# Patient Record
Sex: FEMALE | Race: WHITE | Employment: FULL TIME | URBAN - NONMETROPOLITAN AREA
[De-identification: names, ages, dates, MRNs, and addresses within clinical notes are randomized per-mention and may not be internally consistent; named-entity substitution may affect disease eponyms.]

---

## 2017-01-10 DIAGNOSIS — R23.2 HOT FLASHES: ICD-10-CM

## 2017-01-10 DIAGNOSIS — F41.9 ANXIETY: ICD-10-CM

## 2017-01-10 RX ORDER — CLONIDINE HYDROCHLORIDE 0.1 MG/1
TABLET ORAL
Qty: 60 TABLET | Refills: 0 | Status: SHIPPED | OUTPATIENT
Start: 2017-01-10 | End: 2018-06-18

## 2017-05-30 DIAGNOSIS — I10 ESSENTIAL HYPERTENSION: ICD-10-CM

## 2017-05-30 RX ORDER — LISINOPRIL 20 MG/1
20 TABLET ORAL DAILY
Qty: 30 TABLET | Refills: 0 | Status: SHIPPED | OUTPATIENT
Start: 2017-05-30 | End: 2017-06-06 | Stop reason: SDUPTHER

## 2017-06-06 DIAGNOSIS — I10 ESSENTIAL HYPERTENSION: ICD-10-CM

## 2017-06-06 DIAGNOSIS — F41.9 ANXIETY: ICD-10-CM

## 2017-06-06 DIAGNOSIS — F33.1 MODERATE EPISODE OF RECURRENT MAJOR DEPRESSIVE DISORDER (HCC): ICD-10-CM

## 2017-06-06 RX ORDER — LISINOPRIL 20 MG/1
20 TABLET ORAL DAILY
Qty: 30 TABLET | Refills: 0 | Status: SHIPPED | OUTPATIENT
Start: 2017-06-06 | End: 2017-06-16 | Stop reason: SDUPTHER

## 2017-06-06 RX ORDER — CITALOPRAM 20 MG/1
TABLET ORAL
Qty: 30 TABLET | Refills: 0 | Status: SHIPPED | OUTPATIENT
Start: 2017-06-06 | End: 2017-06-16 | Stop reason: SDUPTHER

## 2017-06-06 RX ORDER — HYDROCHLOROTHIAZIDE 12.5 MG/1
CAPSULE, GELATIN COATED ORAL
Qty: 30 CAPSULE | Refills: 0 | Status: SHIPPED | OUTPATIENT
Start: 2017-06-06 | End: 2017-06-16 | Stop reason: SDUPTHER

## 2017-06-16 ENCOUNTER — OFFICE VISIT (OUTPATIENT)
Dept: FAMILY MEDICINE CLINIC | Age: 52
End: 2017-06-16

## 2017-06-16 VITALS
WEIGHT: 256 LBS | DIASTOLIC BLOOD PRESSURE: 74 MMHG | OXYGEN SATURATION: 98 % | HEART RATE: 76 BPM | BODY MASS INDEX: 45.36 KG/M2 | SYSTOLIC BLOOD PRESSURE: 126 MMHG | HEIGHT: 63 IN

## 2017-06-16 DIAGNOSIS — E78.5 HYPERLIPIDEMIA, UNSPECIFIED HYPERLIPIDEMIA TYPE: ICD-10-CM

## 2017-06-16 DIAGNOSIS — Z11.4 SCREENING FOR HIV WITHOUT PRESENCE OF RISK FACTORS: ICD-10-CM

## 2017-06-16 DIAGNOSIS — F33.1 MODERATE EPISODE OF RECURRENT MAJOR DEPRESSIVE DISORDER (HCC): Primary | ICD-10-CM

## 2017-06-16 DIAGNOSIS — L29.9 PRURITIC DERMATITIS: ICD-10-CM

## 2017-06-16 DIAGNOSIS — F41.9 ANXIETY: ICD-10-CM

## 2017-06-16 DIAGNOSIS — Z72.0 TOBACCO ABUSE: ICD-10-CM

## 2017-06-16 DIAGNOSIS — Z23 NEED FOR PNEUMOCOCCAL VACCINATION: ICD-10-CM

## 2017-06-16 DIAGNOSIS — Z12.31 ENCOUNTER FOR SCREENING MAMMOGRAM FOR BREAST CANCER: ICD-10-CM

## 2017-06-16 DIAGNOSIS — Z12.11 SCREENING FOR COLON CANCER: ICD-10-CM

## 2017-06-16 DIAGNOSIS — Z11.59 NEED FOR HEPATITIS C SCREENING TEST: ICD-10-CM

## 2017-06-16 DIAGNOSIS — I10 ESSENTIAL HYPERTENSION: ICD-10-CM

## 2017-06-16 DIAGNOSIS — E55.9 VITAMIN D DEFICIENCY: ICD-10-CM

## 2017-06-16 PROCEDURE — 90732 PPSV23 VACC 2 YRS+ SUBQ/IM: CPT | Performed by: NURSE PRACTITIONER

## 2017-06-16 PROCEDURE — 90471 IMMUNIZATION ADMIN: CPT | Performed by: NURSE PRACTITIONER

## 2017-06-16 PROCEDURE — 99214 OFFICE O/P EST MOD 30 MIN: CPT | Performed by: NURSE PRACTITIONER

## 2017-06-16 RX ORDER — HYDROCHLOROTHIAZIDE 12.5 MG/1
CAPSULE, GELATIN COATED ORAL
Qty: 30 CAPSULE | Refills: 5 | Status: SHIPPED | OUTPATIENT
Start: 2017-06-16 | End: 2017-11-01 | Stop reason: SDUPTHER

## 2017-06-16 RX ORDER — LISINOPRIL 20 MG/1
20 TABLET ORAL DAILY
Qty: 30 TABLET | Refills: 5 | Status: SHIPPED | OUTPATIENT
Start: 2017-06-16 | End: 2017-11-01 | Stop reason: SDUPTHER

## 2017-06-16 RX ORDER — CITALOPRAM 40 MG/1
TABLET ORAL
Qty: 30 TABLET | Refills: 1 | Status: SHIPPED | OUTPATIENT
Start: 2017-06-16 | End: 2017-08-07 | Stop reason: ALTCHOICE

## 2017-06-16 ASSESSMENT — PATIENT HEALTH QUESTIONNAIRE - PHQ9
1. LITTLE INTEREST OR PLEASURE IN DOING THINGS: 0
SUM OF ALL RESPONSES TO PHQ QUESTIONS 1-9: 0
2. FEELING DOWN, DEPRESSED OR HOPELESS: 0
SUM OF ALL RESPONSES TO PHQ9 QUESTIONS 1 & 2: 0

## 2017-06-16 ASSESSMENT — ENCOUNTER SYMPTOMS
GASTROINTESTINAL NEGATIVE: 1
RESPIRATORY NEGATIVE: 1
EYES NEGATIVE: 1

## 2017-06-21 ENCOUNTER — TELEPHONE (OUTPATIENT)
Dept: OTHER | Facility: CLINIC | Age: 52
End: 2017-06-21

## 2017-08-07 ENCOUNTER — OFFICE VISIT (OUTPATIENT)
Dept: FAMILY MEDICINE CLINIC | Age: 52
End: 2017-08-07

## 2017-08-07 VITALS
HEIGHT: 63 IN | HEART RATE: 81 BPM | SYSTOLIC BLOOD PRESSURE: 122 MMHG | DIASTOLIC BLOOD PRESSURE: 84 MMHG | WEIGHT: 263.2 LBS | BODY MASS INDEX: 46.64 KG/M2 | OXYGEN SATURATION: 97 %

## 2017-08-07 DIAGNOSIS — F41.8 DEPRESSION WITH ANXIETY: Primary | ICD-10-CM

## 2017-08-07 DIAGNOSIS — Z13.31 POSITIVE DEPRESSION SCREENING: ICD-10-CM

## 2017-08-07 PROCEDURE — G8431 POS CLIN DEPRES SCRN F/U DOC: HCPCS | Performed by: NURSE PRACTITIONER

## 2017-08-07 PROCEDURE — 99213 OFFICE O/P EST LOW 20 MIN: CPT | Performed by: NURSE PRACTITIONER

## 2017-08-07 PROCEDURE — G0444 DEPRESSION SCREEN ANNUAL: HCPCS | Performed by: NURSE PRACTITIONER

## 2017-08-07 ASSESSMENT — PATIENT HEALTH QUESTIONNAIRE - PHQ9
SUM OF ALL RESPONSES TO PHQ9 QUESTIONS 1 & 2: 6
4. FEELING TIRED OR HAVING LITTLE ENERGY: 3
7. TROUBLE CONCENTRATING ON THINGS, SUCH AS READING THE NEWSPAPER OR WATCHING TELEVISION: 1
2. FEELING DOWN, DEPRESSED OR HOPELESS: 3
8. MOVING OR SPEAKING SO SLOWLY THAT OTHER PEOPLE COULD HAVE NOTICED. OR THE OPPOSITE, BEING SO FIGETY OR RESTLESS THAT YOU HAVE BEEN MOVING AROUND A LOT MORE THAN USUAL: 1
5. POOR APPETITE OR OVEREATING: 3
9. THOUGHTS THAT YOU WOULD BE BETTER OFF DEAD, OR OF HURTING YOURSELF: 0
3. TROUBLE FALLING OR STAYING ASLEEP: 3
1. LITTLE INTEREST OR PLEASURE IN DOING THINGS: 3
SUM OF ALL RESPONSES TO PHQ QUESTIONS 1-9: 18
10. IF YOU CHECKED OFF ANY PROBLEMS, HOW DIFFICULT HAVE THESE PROBLEMS MADE IT FOR YOU TO DO YOUR WORK, TAKE CARE OF THINGS AT HOME, OR GET ALONG WITH OTHER PEOPLE: 0
6. FEELING BAD ABOUT YOURSELF - OR THAT YOU ARE A FAILURE OR HAVE LET YOURSELF OR YOUR FAMILY DOWN: 1

## 2017-08-07 ASSESSMENT — ENCOUNTER SYMPTOMS
RESPIRATORY NEGATIVE: 1
GASTROINTESTINAL NEGATIVE: 1
EYES NEGATIVE: 1

## 2017-09-02 DIAGNOSIS — F41.8 DEPRESSION WITH ANXIETY: ICD-10-CM

## 2017-09-07 ENCOUNTER — TELEPHONE (OUTPATIENT)
Dept: FAMILY MEDICINE CLINIC | Age: 52
End: 2017-09-07

## 2017-09-07 DIAGNOSIS — F41.8 DEPRESSION WITH ANXIETY: ICD-10-CM

## 2017-10-10 DIAGNOSIS — F41.8 DEPRESSION WITH ANXIETY: ICD-10-CM

## 2017-10-10 NOTE — TELEPHONE ENCOUNTER
[phone] (home) I spoke to the pt at this time and she states she is taking the zoloft and she states she is doing a lot better.  Appointment scheduled October 30th at 10:20 am.

## 2017-10-10 NOTE — TELEPHONE ENCOUNTER
Please verify that patient is still taking this medication and how she is doing on the medication. Patient shows she was last seen in August and was prescribed this and informed not to take celexa - is this what she is doing? She needs to make a follow up appointment with fasting blood work.

## 2017-10-30 ENCOUNTER — OFFICE VISIT (OUTPATIENT)
Dept: FAMILY MEDICINE CLINIC | Age: 52
End: 2017-10-30

## 2017-10-30 VITALS
HEART RATE: 88 BPM | WEIGHT: 273 LBS | HEIGHT: 63 IN | OXYGEN SATURATION: 98 % | BODY MASS INDEX: 48.37 KG/M2 | DIASTOLIC BLOOD PRESSURE: 74 MMHG | SYSTOLIC BLOOD PRESSURE: 120 MMHG

## 2017-10-30 DIAGNOSIS — F41.8 DEPRESSION WITH ANXIETY: Primary | ICD-10-CM

## 2017-10-30 DIAGNOSIS — R29.6 RECURRENT FALLS WHILE WALKING: ICD-10-CM

## 2017-10-30 PROCEDURE — 99213 OFFICE O/P EST LOW 20 MIN: CPT | Performed by: NURSE PRACTITIONER

## 2017-10-30 RX ORDER — SERTRALINE HYDROCHLORIDE 100 MG/1
100 TABLET, FILM COATED ORAL DAILY
Qty: 30 TABLET | Refills: 5 | Status: SHIPPED | OUTPATIENT
Start: 2017-10-30 | End: 2018-06-18

## 2017-10-30 ASSESSMENT — ENCOUNTER SYMPTOMS
EYES NEGATIVE: 1
GASTROINTESTINAL NEGATIVE: 1
RESPIRATORY NEGATIVE: 1

## 2017-10-30 NOTE — PROGRESS NOTES
Subjective:     Patient Name: Nhua Zelaya is a 46 y.o. female. Chief Complaint   Patient presents with    Depression     f/u after starting Zoloft - pt says she's responded well to it        HPI  Mood Disorder:  Patient presents for follow-up of depression and anxiety disorder. Current complaints include: tearfulness, insomnia, fatigue, difficulty concentrating, excessive worry and anxious but all of these symptoms have significantly improved from last office visit of 8/7/17. She denies feelings of hopelessness, feelings of worthlessness/excessive guilt, panic attacks, obsessive thoughts, increased use of drugs or alcohol and suicidal thoughts or behavior. Symptoms/signs of rambo: none. External stressors: nothing new. Current treatment includes: Zoloft- 50 mg daily. Medication side effects: none. Patient was seen on 8/7/17 and at that time she was on Celexa 40 mg and was ineffective. Celexa was discontinued and patient was started on Zoloft 50 mg daily and symptoms still present but significantly improved    Recurrent Falls  Patient is here for evaluation of recurrent falls. Patient reportedly was walking when she fell from standing. The problem started about  2-3 years ago. It is reported that the patient did not have LOC, was ambulatory on scene, denies alcohol consumption and denies drug use. At this time she complains of low back pain--chronic problem without sciatica. Patient denies headache, gever, visual change, hearing loss, neck pain, chest pain, abdominal pain, hematuria, flank pain, nausea, vomiting, numbness, tingling, incontinence and inability to ambulate. Symptoms are exacerbated by Nothing. Patient states she falls a lot while walking only. Doesn't feel dizzy when changing positions or prior to falling. Falling at least once per week but \"catching self\" daily. No previous workup    Review of Systems   Constitutional: Negative. HENT: Negative. Eyes: Negative. Respiratory: Negative. Cardiovascular: Negative. Gastrointestinal: Negative. Genitourinary: Negative. Musculoskeletal: Positive for falls. Skin: Negative. Neurological: Negative. Endo/Heme/Allergies: Negative. Psychiatric/Behavioral: Positive for depression. The patient is nervous/anxious and has insomnia. All other systems reviewed and are negative. Past Medical History:   Diagnosis Date    Back pain     Hypertension     Plantar fasciitis      Family History   Problem Relation Age of Onset    Diabetes Mother     High Blood Pressure Mother     High Blood Pressure Father     High Blood Pressure Sister      Past Surgical History:   Procedure Laterality Date     SECTION       Social History     Social History    Marital status:      Spouse name: N/A    Number of children: N/A    Years of education: N/A     Occupational History    Not on file. Social History Main Topics    Smoking status: Current Every Day Smoker     Packs/day: 0.50     Types: Cigarettes     Last attempt to quit: 2015    Smokeless tobacco: Never Used    Alcohol use No    Drug use: No    Sexual activity: Yes     Partners: Male     Other Topics Concern    Not on file     Social History Narrative    No narrative on file     Current Outpatient Prescriptions   Medication Sig Dispense Refill    sertraline (ZOLOFT) 50 MG tablet TAKE ONE TABLET BY MOUTH DAILY 90 tablet 4    hydrochlorothiazide (MICROZIDE) 12.5 MG capsule TAKE ONE CAPSULE BY MOUTH DAILY 30 capsule 5    lisinopril (PRINIVIL;ZESTRIL) 20 MG tablet Take 1 tablet by mouth daily 30 tablet 5    cloNIDine (CATAPRES) 0.1 MG tablet Take 1 tablet by mouth daily as needed 60 tablet 0     No current facility-administered medications for this visit. Objective:       Physical Exam   Constitutional: She is oriented to person, place, and time. She appears well-developed and well-nourished. No distress.    HENT:   Head: Normocephalic and atraumatic. Right Ear: Tympanic membrane, external ear and ear canal normal.   Left Ear: Tympanic membrane, external ear and ear canal normal.   Nose: Nose normal.   Mouth/Throat: Uvula is midline, oropharynx is clear and moist and mucous membranes are normal. No oropharyngeal exudate. Eyes: Conjunctivae, EOM and lids are normal. Pupils are equal, round, and reactive to light. Neck: Normal range of motion. Neck supple. No JVD present. Carotid bruit is not present. No thyromegaly present. Cardiovascular: Normal rate, regular rhythm, normal heart sounds and normal pulses. Exam reveals no gallop and no friction rub. No murmur heard. Pulses:       Radial pulses are 2+ on the right side, and 2+ on the left side. Dorsalis pedis pulses are 2+ on the right side, and 2+ on the left side. Posterior tibial pulses are 2+ on the right side, and 2+ on the left side. Pulmonary/Chest: Effort normal and breath sounds normal.   Abdominal: Soft. Normal appearance and bowel sounds are normal. She exhibits no mass. There is no hepatosplenomegaly. There is no tenderness. Musculoskeletal: Normal range of motion. She exhibits no edema. Lymphadenopathy:        Head (right side): No submandibular adenopathy present. Head (left side): No submandibular adenopathy present. She has no cervical adenopathy. Neurological: She is alert and oriented to person, place, and time. She has normal strength. Gait normal.   Skin: Skin is warm and dry. No lesion and no rash noted. Psychiatric: She has a normal mood and affect. Her speech is normal and behavior is normal. Judgment and thought content normal. Cognition and memory are normal.   Nursing note and vitals reviewed.       /74 (Site: Left Arm, Position: Sitting, Cuff Size: Large Adult)   Pulse 88   Ht 5' 3\" (1.6 m)   Wt 273 lb (123.8 kg)   LMP 04/10/2012 (Exact Date)   SpO2 98% Comment: CHRISSY  BMI 48.36 kg/m²   Body mass index is 48.36 kg/m². BP Readings from Last 2 Encounters:   10/30/17 120/74   10/01/17 117/65       Wt Readings from Last 3 Encounters:   10/30/17 273 lb (123.8 kg)   10/01/17 265 lb (120.2 kg)   08/07/17 263 lb 3.2 oz (119.4 kg)       Lab Review   No visits with results within 2 Month(s) from this visit. Latest known visit with results is:   Nurse Only on 10/26/2016   Component Date Value    Sodium 10/26/2016 142     Potassium 10/26/2016 4.2     Chloride 10/26/2016 103     CO2 10/26/2016 27     Anion Gap 10/26/2016 12     Glucose 10/26/2016 96     BUN 10/26/2016 17     CREATININE 10/26/2016 0.5*    GFR Non- 10/26/2016 >60     GFR  10/26/2016 >60     Calcium 10/26/2016 9.2     Total Protein 10/26/2016 6.4     Alb 10/26/2016 4.0     Albumin/Globulin Ratio 10/26/2016 1.7     Total Bilirubin 10/26/2016 <0.2     Alkaline Phosphatase 10/26/2016 63     ALT 10/26/2016 14     AST 10/26/2016 14*    Globulin 10/26/2016 2.4     Cholesterol, Total 10/26/2016 151     Triglycerides 10/26/2016 219*    HDL 10/26/2016 48     LDL Calculated 10/26/2016 59     VLDL CHOLESTEROL CALCULA* 10/26/2016 44     WBC 10/26/2016 6.0     RBC 10/26/2016 4.32     Hemoglobin 10/26/2016 12.8     Hematocrit 10/26/2016 39.0     MCV 10/26/2016 90.4     MCH 10/26/2016 29.6     MCHC 10/26/2016 32.8     RDW 10/26/2016 14.4     Platelets 71/59/1106 187     MPV 10/26/2016 10.5     Neutrophils % 10/26/2016 55.9     Lymphocytes % 10/26/2016 32.2     Monocytes % 10/26/2016 9.3     Eosinophils % 10/26/2016 1.7     Basophils % 10/26/2016 0.9     Neutrophils # 10/26/2016 3.4     Lymphocytes # 10/26/2016 1.9     Monocytes # 10/26/2016 0.6     Eosinophils # 10/26/2016 0.1     Basophils # 10/26/2016 0.1        No results found for this visit on 10/30/17. Assessment:       1. Depression with anxiety    2.  Recurrent falls while walking               Plan:       Luiz Samuel was seen today for depression. Diagnoses and all orders for this visit:    Depression with anxiety  Patient is having significant improvement on Zoloft however she is still somewhat symptomatic on Zoloft 50 mg daily. Will increase Zoloft to chin as noted below  -     sertraline (ZOLOFT) 100 MG tablet; Take 1 tablet by mouth daily  Educated if patient develops SI/HI/rambo to call 911 or go to ER. Discussed use, benefit, risks and side effects of prescribed medications. Barriers to compliance discussed. All patient questions answered. Pt voiced understanding. Recurrent falls while walking  Unclear etiology. Patient is to have fasting labs later this week but will also add the following labs  -     TSH without Reflex; Future  -     MAGNESIUM; Future    Once blood work results have returned if there is no appearance reason will consider MRI of the brain and/or neurology consult      Patient has been instructed call the office immediately with new symptoms, change in symptoms or worsening of symptoms. If this is not feasible, patient is instructed to report to the emergency room. Medication profile reviewed. Medication side effects and possible impairments from medications were discussed as applicable. Allergies were reviewed. Health maintenance was reviewed and updated as appropriate.

## 2017-11-01 DIAGNOSIS — I10 ESSENTIAL HYPERTENSION: ICD-10-CM

## 2017-11-01 RX ORDER — HYDROCHLOROTHIAZIDE 12.5 MG/1
CAPSULE, GELATIN COATED ORAL
Qty: 90 CAPSULE | Refills: 3 | Status: SHIPPED | OUTPATIENT
Start: 2017-11-01 | End: 2018-12-01 | Stop reason: SDUPTHER

## 2017-11-02 ENCOUNTER — NURSE ONLY (OUTPATIENT)
Dept: FAMILY MEDICINE CLINIC | Age: 52
End: 2017-11-02

## 2017-11-02 DIAGNOSIS — Z11.59 NEED FOR HEPATITIS C SCREENING TEST: ICD-10-CM

## 2017-11-02 DIAGNOSIS — R29.6 RECURRENT FALLS WHILE WALKING: ICD-10-CM

## 2017-11-02 DIAGNOSIS — E55.9 VITAMIN D DEFICIENCY: ICD-10-CM

## 2017-11-02 DIAGNOSIS — I10 ESSENTIAL HYPERTENSION: ICD-10-CM

## 2017-11-02 DIAGNOSIS — Z11.4 SCREENING FOR HIV WITHOUT PRESENCE OF RISK FACTORS: ICD-10-CM

## 2017-11-02 DIAGNOSIS — E78.5 HYPERLIPIDEMIA, UNSPECIFIED HYPERLIPIDEMIA TYPE: ICD-10-CM

## 2017-11-02 LAB
A/G RATIO: 1.4 (ref 1.1–2.2)
ALBUMIN SERPL-MCNC: 4.2 G/DL (ref 3.4–5)
ALP BLD-CCNC: 77 U/L (ref 40–129)
ALT SERPL-CCNC: 14 U/L (ref 10–40)
ANION GAP SERPL CALCULATED.3IONS-SCNC: 19 MMOL/L (ref 3–16)
AST SERPL-CCNC: 15 U/L (ref 15–37)
BASOPHILS ABSOLUTE: 0 K/UL (ref 0–0.2)
BASOPHILS RELATIVE PERCENT: 0.6 %
BILIRUB SERPL-MCNC: 0.3 MG/DL (ref 0–1)
BUN BLDV-MCNC: 23 MG/DL (ref 7–20)
CALCIUM SERPL-MCNC: 9.7 MG/DL (ref 8.3–10.6)
CHLORIDE BLD-SCNC: 100 MMOL/L (ref 99–110)
CHOLESTEROL, TOTAL: 183 MG/DL (ref 0–199)
CO2: 23 MMOL/L (ref 21–32)
CREAT SERPL-MCNC: <0.5 MG/DL (ref 0.6–1.1)
EOSINOPHILS ABSOLUTE: 0.1 K/UL (ref 0–0.6)
EOSINOPHILS RELATIVE PERCENT: 2 %
GFR AFRICAN AMERICAN: >60
GFR NON-AFRICAN AMERICAN: >60
GLOBULIN: 3.1 G/DL
GLUCOSE BLD-MCNC: 90 MG/DL (ref 70–99)
HCT VFR BLD CALC: 39.6 % (ref 36–48)
HDLC SERPL-MCNC: 58 MG/DL (ref 40–60)
HEMOGLOBIN: 13.2 G/DL (ref 12–16)
HEPATITIS C ANTIBODY INTERPRETATION: NORMAL
LDL CHOLESTEROL CALCULATED: 81 MG/DL
LYMPHOCYTES ABSOLUTE: 2.2 K/UL (ref 1–5.1)
LYMPHOCYTES RELATIVE PERCENT: 31.4 %
MAGNESIUM: 2.2 MG/DL (ref 1.8–2.4)
MCH RBC QN AUTO: 30.2 PG (ref 26–34)
MCHC RBC AUTO-ENTMCNC: 33.3 G/DL (ref 31–36)
MCV RBC AUTO: 90.8 FL (ref 80–100)
MONOCYTES ABSOLUTE: 0.7 K/UL (ref 0–1.3)
MONOCYTES RELATIVE PERCENT: 9.7 %
NEUTROPHILS ABSOLUTE: 3.9 K/UL (ref 1.7–7.7)
NEUTROPHILS RELATIVE PERCENT: 56.3 %
PDW BLD-RTO: 14.6 % (ref 12.4–15.4)
PLATELET # BLD: 220 K/UL (ref 135–450)
PMV BLD AUTO: 10.2 FL (ref 5–10.5)
POTASSIUM SERPL-SCNC: 4.4 MMOL/L (ref 3.5–5.1)
RBC # BLD: 4.36 M/UL (ref 4–5.2)
SODIUM BLD-SCNC: 142 MMOL/L (ref 136–145)
TOTAL PROTEIN: 7.3 G/DL (ref 6.4–8.2)
TRIGL SERPL-MCNC: 219 MG/DL (ref 0–150)
TSH SERPL DL<=0.05 MIU/L-ACNC: 1.65 UIU/ML (ref 0.27–4.2)
VITAMIN D 25-HYDROXY: 17.6 NG/ML
VLDLC SERPL CALC-MCNC: 44 MG/DL
WBC # BLD: 6.9 K/UL (ref 4–11)

## 2017-11-02 PROCEDURE — 36415 COLL VENOUS BLD VENIPUNCTURE: CPT | Performed by: NURSE PRACTITIONER

## 2017-11-03 LAB — HIV-1 AND HIV-2 ANTIBODIES: NORMAL

## 2017-11-13 DIAGNOSIS — E55.9 VITAMIN D DEFICIENCY: Primary | ICD-10-CM

## 2017-11-13 RX ORDER — MELATONIN 10 MG
1 TABLET, SUBLINGUAL SUBLINGUAL WEEKLY
Qty: 4 TABLET | Refills: 5 | Status: SHIPPED | OUTPATIENT
Start: 2017-11-13 | End: 2019-01-18 | Stop reason: DRUGHIGH

## 2018-05-17 DIAGNOSIS — I10 ESSENTIAL HYPERTENSION: ICD-10-CM

## 2018-05-18 RX ORDER — LISINOPRIL 20 MG/1
TABLET ORAL
Qty: 30 TABLET | Refills: 0 | Status: SHIPPED | OUTPATIENT
Start: 2018-05-18 | End: 2018-06-26 | Stop reason: SDUPTHER

## 2018-06-18 ENCOUNTER — HOSPITAL ENCOUNTER (OUTPATIENT)
Dept: OTHER | Age: 53
Discharge: HOME | End: 2018-06-18
Attending: NURSE PRACTITIONER | Admitting: NURSE PRACTITIONER

## 2018-06-18 ENCOUNTER — OFFICE VISIT (OUTPATIENT)
Dept: FAMILY MEDICINE CLINIC | Age: 53
End: 2018-06-18

## 2018-06-18 VITALS
OXYGEN SATURATION: 97 % | SYSTOLIC BLOOD PRESSURE: 118 MMHG | HEART RATE: 96 BPM | BODY MASS INDEX: 49.46 KG/M2 | WEIGHT: 279.2 LBS | DIASTOLIC BLOOD PRESSURE: 78 MMHG

## 2018-06-18 DIAGNOSIS — Z23 NEED FOR PROPHYLACTIC VACCINATION AGAINST DIPHTHERIA-TETANUS-PERTUSSIS (DTP): ICD-10-CM

## 2018-06-18 DIAGNOSIS — F41.8 DEPRESSION WITH ANXIETY: Primary | ICD-10-CM

## 2018-06-18 DIAGNOSIS — Z23 NEED FOR PROPHYLACTIC VACCINATION AND INOCULATION AGAINST VARICELLA: ICD-10-CM

## 2018-06-18 DIAGNOSIS — R05.3 CHRONIC COUGH: ICD-10-CM

## 2018-06-18 DIAGNOSIS — Z12.11 SCREENING FOR COLON CANCER: ICD-10-CM

## 2018-06-18 DIAGNOSIS — E55.9 VITAMIN D DEFICIENCY: ICD-10-CM

## 2018-06-18 DIAGNOSIS — I10 ESSENTIAL HYPERTENSION: ICD-10-CM

## 2018-06-18 DIAGNOSIS — Z72.0 TOBACCO ABUSE: ICD-10-CM

## 2018-06-18 DIAGNOSIS — Z12.39 BREAST CANCER SCREENING: ICD-10-CM

## 2018-06-18 PROBLEM — Z87.891 HISTORY OF TOBACCO USE: Status: ACTIVE | Noted: 2018-06-18

## 2018-06-18 PROCEDURE — 99214 OFFICE O/P EST MOD 30 MIN: CPT | Performed by: NURSE PRACTITIONER

## 2018-06-18 PROCEDURE — 90715 TDAP VACCINE 7 YRS/> IM: CPT | Performed by: NURSE PRACTITIONER

## 2018-06-18 PROCEDURE — 90471 IMMUNIZATION ADMIN: CPT | Performed by: NURSE PRACTITIONER

## 2018-06-18 ASSESSMENT — ENCOUNTER SYMPTOMS
SHORTNESS OF BREATH: 0
COUGH: 1
SORE THROAT: 0
WHEEZING: 1
HEMOPTYSIS: 0
HEARTBURN: 1
RHINORRHEA: 0

## 2018-06-19 PROBLEM — Z87.891 HISTORY OF TOBACCO USE: Status: RESOLVED | Noted: 2018-06-18 | Resolved: 2018-06-19

## 2018-06-19 PROBLEM — E55.9 VITAMIN D DEFICIENCY: Status: ACTIVE | Noted: 2018-06-19

## 2018-06-19 RX ORDER — OMEPRAZOLE 40 MG/1
40 CAPSULE, DELAYED RELEASE ORAL DAILY
Qty: 30 CAPSULE | Refills: 1 | Status: SHIPPED | OUTPATIENT
Start: 2018-06-19 | End: 2018-08-29 | Stop reason: SDUPTHER

## 2018-06-19 ASSESSMENT — ENCOUNTER SYMPTOMS: EYES NEGATIVE: 1

## 2018-06-26 DIAGNOSIS — I10 ESSENTIAL HYPERTENSION: ICD-10-CM

## 2018-06-26 RX ORDER — LISINOPRIL 20 MG/1
TABLET ORAL
Qty: 30 TABLET | Refills: 1 | Status: SHIPPED | OUTPATIENT
Start: 2018-06-26 | End: 2018-08-29 | Stop reason: SDUPTHER

## 2018-07-20 DIAGNOSIS — Z12.39 SCREENING BREAST EXAMINATION: Primary | ICD-10-CM

## 2018-08-29 DIAGNOSIS — I10 ESSENTIAL HYPERTENSION: ICD-10-CM

## 2018-08-30 RX ORDER — LISINOPRIL 20 MG/1
TABLET ORAL
Qty: 30 TABLET | Refills: 2 | Status: SHIPPED | OUTPATIENT
Start: 2018-08-30 | End: 2018-12-01 | Stop reason: SDUPTHER

## 2018-12-01 DIAGNOSIS — I10 ESSENTIAL HYPERTENSION: ICD-10-CM

## 2018-12-03 RX ORDER — HYDROCHLOROTHIAZIDE 12.5 MG/1
CAPSULE, GELATIN COATED ORAL
Qty: 90 CAPSULE | Refills: 0 | Status: SHIPPED | OUTPATIENT
Start: 2018-12-03 | End: 2019-03-06 | Stop reason: SDUPTHER

## 2018-12-03 RX ORDER — LISINOPRIL 20 MG/1
TABLET ORAL
Qty: 30 TABLET | Refills: 0 | Status: SHIPPED | OUTPATIENT
Start: 2018-12-03 | End: 2019-01-05 | Stop reason: SDUPTHER

## 2019-01-05 DIAGNOSIS — I10 ESSENTIAL HYPERTENSION: ICD-10-CM

## 2019-01-07 RX ORDER — LISINOPRIL 20 MG/1
TABLET ORAL
Qty: 30 TABLET | Refills: 0 | Status: SHIPPED | OUTPATIENT
Start: 2019-01-07 | End: 2019-02-05 | Stop reason: SDUPTHER

## 2019-01-16 ENCOUNTER — OFFICE VISIT (OUTPATIENT)
Dept: FAMILY MEDICINE CLINIC | Age: 54
End: 2019-01-16
Payer: COMMERCIAL

## 2019-01-16 VITALS
OXYGEN SATURATION: 98 % | WEIGHT: 283 LBS | DIASTOLIC BLOOD PRESSURE: 78 MMHG | HEIGHT: 63 IN | SYSTOLIC BLOOD PRESSURE: 102 MMHG | HEART RATE: 81 BPM | BODY MASS INDEX: 50.14 KG/M2

## 2019-01-16 DIAGNOSIS — K59.00 CONSTIPATION, UNSPECIFIED CONSTIPATION TYPE: ICD-10-CM

## 2019-01-16 DIAGNOSIS — I10 ESSENTIAL HYPERTENSION: Primary | ICD-10-CM

## 2019-01-16 DIAGNOSIS — Z12.39 SCREENING FOR BREAST CANCER: ICD-10-CM

## 2019-01-16 DIAGNOSIS — N95.1 MENOPAUSAL SYMPTOMS: ICD-10-CM

## 2019-01-16 DIAGNOSIS — K21.9 GASTROESOPHAGEAL REFLUX DISEASE WITHOUT ESOPHAGITIS: ICD-10-CM

## 2019-01-16 DIAGNOSIS — E55.9 VITAMIN D DEFICIENCY: ICD-10-CM

## 2019-01-16 DIAGNOSIS — F41.8 DEPRESSION WITH ANXIETY: ICD-10-CM

## 2019-01-16 DIAGNOSIS — Z12.11 SCREENING FOR COLON CANCER: ICD-10-CM

## 2019-01-16 PROCEDURE — 99214 OFFICE O/P EST MOD 30 MIN: CPT | Performed by: NURSE PRACTITIONER

## 2019-01-16 PROCEDURE — 36415 COLL VENOUS BLD VENIPUNCTURE: CPT | Performed by: NURSE PRACTITIONER

## 2019-01-16 RX ORDER — VENLAFAXINE HYDROCHLORIDE 75 MG/1
75 CAPSULE, EXTENDED RELEASE ORAL DAILY
Qty: 30 CAPSULE | Refills: 1 | Status: SHIPPED | OUTPATIENT
Start: 2019-01-16 | End: 2019-03-08 | Stop reason: DRUGHIGH

## 2019-01-16 ASSESSMENT — PATIENT HEALTH QUESTIONNAIRE - PHQ9
SUM OF ALL RESPONSES TO PHQ QUESTIONS 1-9: 1
SUM OF ALL RESPONSES TO PHQ QUESTIONS 1-9: 1
2. FEELING DOWN, DEPRESSED OR HOPELESS: 1
1. LITTLE INTEREST OR PLEASURE IN DOING THINGS: 0
SUM OF ALL RESPONSES TO PHQ9 QUESTIONS 1 & 2: 1

## 2019-01-16 ASSESSMENT — ENCOUNTER SYMPTOMS
DIARRHEA: 0
NAUSEA: 0
ABDOMINAL PAIN: 0
RECTAL PAIN: 0
CONSTIPATION: 1
BLOOD IN STOOL: 1
EYES NEGATIVE: 1
RESPIRATORY NEGATIVE: 1
ABDOMINAL DISTENTION: 0
VOMITING: 0
ANAL BLEEDING: 0

## 2019-01-17 LAB
A/G RATIO: 1.5 (ref 1.1–2.2)
ALBUMIN SERPL-MCNC: 4.3 G/DL (ref 3.4–5)
ALP BLD-CCNC: 74 U/L (ref 40–129)
ALT SERPL-CCNC: 21 U/L (ref 10–40)
ANION GAP SERPL CALCULATED.3IONS-SCNC: 15 MMOL/L (ref 3–16)
AST SERPL-CCNC: 22 U/L (ref 15–37)
BASOPHILS ABSOLUTE: 0 K/UL (ref 0–0.2)
BASOPHILS RELATIVE PERCENT: 0.8 %
BILIRUB SERPL-MCNC: 0.3 MG/DL (ref 0–1)
BUN BLDV-MCNC: 15 MG/DL (ref 7–20)
CALCIUM SERPL-MCNC: 9.5 MG/DL (ref 8.3–10.6)
CHLORIDE BLD-SCNC: 105 MMOL/L (ref 99–110)
CO2: 24 MMOL/L (ref 21–32)
CREAT SERPL-MCNC: <0.5 MG/DL (ref 0.6–1.1)
EOSINOPHILS ABSOLUTE: 0.1 K/UL (ref 0–0.6)
EOSINOPHILS RELATIVE PERCENT: 1.3 %
GFR AFRICAN AMERICAN: >60
GFR NON-AFRICAN AMERICAN: >60
GLOBULIN: 2.9 G/DL
GLUCOSE BLD-MCNC: 93 MG/DL (ref 70–99)
HCT VFR BLD CALC: 39.3 % (ref 36–48)
HEMOGLOBIN: 13.2 G/DL (ref 12–16)
LYMPHOCYTES ABSOLUTE: 1.9 K/UL (ref 1–5.1)
LYMPHOCYTES RELATIVE PERCENT: 30.6 %
MCH RBC QN AUTO: 30.3 PG (ref 26–34)
MCHC RBC AUTO-ENTMCNC: 33.7 G/DL (ref 31–36)
MCV RBC AUTO: 89.9 FL (ref 80–100)
MONOCYTES ABSOLUTE: 0.6 K/UL (ref 0–1.3)
MONOCYTES RELATIVE PERCENT: 9.5 %
NEUTROPHILS ABSOLUTE: 3.5 K/UL (ref 1.7–7.7)
NEUTROPHILS RELATIVE PERCENT: 57.8 %
PDW BLD-RTO: 14.7 % (ref 12.4–15.4)
PLATELET # BLD: 229 K/UL (ref 135–450)
PMV BLD AUTO: 10.4 FL (ref 5–10.5)
POTASSIUM SERPL-SCNC: 4.3 MMOL/L (ref 3.5–5.1)
RBC # BLD: 4.37 M/UL (ref 4–5.2)
SODIUM BLD-SCNC: 144 MMOL/L (ref 136–145)
TOTAL PROTEIN: 7.2 G/DL (ref 6.4–8.2)
VITAMIN D 25-HYDROXY: 14.9 NG/ML
WBC # BLD: 6.1 K/UL (ref 4–11)

## 2019-01-18 DIAGNOSIS — E55.9 VITAMIN D DEFICIENCY: Primary | ICD-10-CM

## 2019-01-18 RX ORDER — CHOLECALCIFEROL (VITAMIN D3) 1250 MCG
1 CAPSULE ORAL WEEKLY
Qty: 4 CAPSULE | Refills: 5 | Status: SHIPPED | OUTPATIENT
Start: 2019-01-18 | End: 2019-07-01 | Stop reason: SDUPTHER

## 2019-01-25 ENCOUNTER — HOSPITAL ENCOUNTER (OUTPATIENT)
Dept: MAMMOGRAPHY | Age: 54
Discharge: HOME | End: 2019-01-30
Payer: COMMERCIAL

## 2019-01-25 DIAGNOSIS — Z12.39 SCREENING BREAST EXAMINATION: ICD-10-CM

## 2019-01-25 PROCEDURE — 77067 SCR MAMMO BI INCL CAD: CPT

## 2019-01-28 DIAGNOSIS — Z12.39 SCREENING FOR BREAST CANCER: Primary | ICD-10-CM

## 2019-02-05 DIAGNOSIS — I10 ESSENTIAL HYPERTENSION: ICD-10-CM

## 2019-02-05 RX ORDER — LISINOPRIL 20 MG/1
TABLET ORAL
Qty: 30 TABLET | Refills: 0 | Status: SHIPPED | OUTPATIENT
Start: 2019-02-05 | End: 2019-03-05 | Stop reason: SDUPTHER

## 2019-03-05 DIAGNOSIS — I10 ESSENTIAL HYPERTENSION: ICD-10-CM

## 2019-03-05 RX ORDER — LISINOPRIL 20 MG/1
TABLET ORAL
Qty: 30 TABLET | Refills: 0 | Status: SHIPPED | OUTPATIENT
Start: 2019-03-05 | End: 2019-04-03 | Stop reason: SDUPTHER

## 2019-03-06 DIAGNOSIS — I10 ESSENTIAL HYPERTENSION: ICD-10-CM

## 2019-03-06 RX ORDER — HYDROCHLOROTHIAZIDE 12.5 MG/1
CAPSULE, GELATIN COATED ORAL
Qty: 90 CAPSULE | Refills: 0 | Status: SHIPPED | OUTPATIENT
Start: 2019-03-06 | End: 2019-06-02 | Stop reason: SDUPTHER

## 2019-03-08 ENCOUNTER — OFFICE VISIT (OUTPATIENT)
Dept: FAMILY MEDICINE CLINIC | Age: 54
End: 2019-03-08
Payer: COMMERCIAL

## 2019-03-08 VITALS
WEIGHT: 289 LBS | HEART RATE: 91 BPM | HEIGHT: 63 IN | BODY MASS INDEX: 51.21 KG/M2 | SYSTOLIC BLOOD PRESSURE: 112 MMHG | DIASTOLIC BLOOD PRESSURE: 80 MMHG | OXYGEN SATURATION: 97 %

## 2019-03-08 DIAGNOSIS — N30.01 ACUTE CYSTITIS WITH HEMATURIA: ICD-10-CM

## 2019-03-08 DIAGNOSIS — F41.8 DEPRESSION WITH ANXIETY: Primary | ICD-10-CM

## 2019-03-08 DIAGNOSIS — R30.0 DYSURIA: ICD-10-CM

## 2019-03-08 DIAGNOSIS — N95.1 MENOPAUSAL SYMPTOMS: ICD-10-CM

## 2019-03-08 LAB
BILIRUBIN, POC: NORMAL
BLOOD URINE, POC: NORMAL
CLARITY, POC: NORMAL
COLOR, POC: NORMAL
GLUCOSE URINE, POC: NORMAL
KETONES, POC: NORMAL
LEUKOCYTE EST, POC: NORMAL
NITRITE, POC: NORMAL
PH, POC: 7
PROTEIN, POC: NORMAL
SPECIFIC GRAVITY, POC: 1.01
UROBILINOGEN, POC: 1

## 2019-03-08 PROCEDURE — 81002 URINALYSIS NONAUTO W/O SCOPE: CPT | Performed by: NURSE PRACTITIONER

## 2019-03-08 PROCEDURE — 99213 OFFICE O/P EST LOW 20 MIN: CPT | Performed by: NURSE PRACTITIONER

## 2019-03-08 PROCEDURE — G0444 DEPRESSION SCREEN ANNUAL: HCPCS | Performed by: NURSE PRACTITIONER

## 2019-03-08 RX ORDER — SULFAMETHOXAZOLE AND TRIMETHOPRIM 800; 160 MG/1; MG/1
1 TABLET ORAL 2 TIMES DAILY
Qty: 14 TABLET | Refills: 0 | Status: SHIPPED | OUTPATIENT
Start: 2019-03-08 | End: 2019-03-15

## 2019-03-08 RX ORDER — VENLAFAXINE HYDROCHLORIDE 150 MG/1
150 CAPSULE, EXTENDED RELEASE ORAL DAILY
Qty: 30 CAPSULE | Refills: 3 | Status: SHIPPED | OUTPATIENT
Start: 2019-03-08 | End: 2019-07-20 | Stop reason: SDUPTHER

## 2019-03-08 RX ORDER — VENLAFAXINE HYDROCHLORIDE 75 MG/1
75 CAPSULE, EXTENDED RELEASE ORAL DAILY
Qty: 30 CAPSULE | Refills: 5 | Status: CANCELLED | OUTPATIENT
Start: 2019-03-08

## 2019-03-08 ASSESSMENT — PATIENT HEALTH QUESTIONNAIRE - PHQ9
SUM OF ALL RESPONSES TO PHQ QUESTIONS 1-9: 9
10. IF YOU CHECKED OFF ANY PROBLEMS, HOW DIFFICULT HAVE THESE PROBLEMS MADE IT FOR YOU TO DO YOUR WORK, TAKE CARE OF THINGS AT HOME, OR GET ALONG WITH OTHER PEOPLE: 0
3. TROUBLE FALLING OR STAYING ASLEEP: 2
8. MOVING OR SPEAKING SO SLOWLY THAT OTHER PEOPLE COULD HAVE NOTICED. OR THE OPPOSITE, BEING SO FIGETY OR RESTLESS THAT YOU HAVE BEEN MOVING AROUND A LOT MORE THAN USUAL: 1
SUM OF ALL RESPONSES TO PHQ QUESTIONS 1-9: 9
5. POOR APPETITE OR OVEREATING: 1
4. FEELING TIRED OR HAVING LITTLE ENERGY: 1
2. FEELING DOWN, DEPRESSED OR HOPELESS: 1
6. FEELING BAD ABOUT YOURSELF - OR THAT YOU ARE A FAILURE OR HAVE LET YOURSELF OR YOUR FAMILY DOWN: 1
SUM OF ALL RESPONSES TO PHQ9 QUESTIONS 1 & 2: 2
9. THOUGHTS THAT YOU WOULD BE BETTER OFF DEAD, OR OF HURTING YOURSELF: 0
7. TROUBLE CONCENTRATING ON THINGS, SUCH AS READING THE NEWSPAPER OR WATCHING TELEVISION: 1
1. LITTLE INTEREST OR PLEASURE IN DOING THINGS: 1

## 2019-03-08 ASSESSMENT — ENCOUNTER SYMPTOMS
RESPIRATORY NEGATIVE: 1
VOMITING: 0
EYES NEGATIVE: 1
GASTROINTESTINAL NEGATIVE: 1
NAUSEA: 0

## 2019-03-11 LAB
ORGANISM: ABNORMAL
URINE CULTURE, ROUTINE: ABNORMAL
URINE CULTURE, ROUTINE: ABNORMAL

## 2019-06-02 DIAGNOSIS — I10 ESSENTIAL HYPERTENSION: ICD-10-CM

## 2019-06-03 RX ORDER — HYDROCHLOROTHIAZIDE 12.5 MG/1
CAPSULE, GELATIN COATED ORAL
Qty: 90 CAPSULE | Refills: 0 | Status: SHIPPED | OUTPATIENT
Start: 2019-06-03 | End: 2019-07-01 | Stop reason: SDUPTHER

## 2019-06-03 NOTE — TELEPHONE ENCOUNTER
Viky Hidalgo is requesting refill(s)   Last OV 3/8/19 (pertaining to medication)  LR 3/6/19 (per medication requested)  Next office visit scheduled or attempted Yes   If no, reason:  7/1/19

## 2019-07-01 ENCOUNTER — OFFICE VISIT (OUTPATIENT)
Dept: FAMILY MEDICINE CLINIC | Age: 54
End: 2019-07-01
Payer: COMMERCIAL

## 2019-07-01 VITALS
OXYGEN SATURATION: 98 % | HEIGHT: 63 IN | SYSTOLIC BLOOD PRESSURE: 132 MMHG | HEART RATE: 78 BPM | BODY MASS INDEX: 51.38 KG/M2 | DIASTOLIC BLOOD PRESSURE: 78 MMHG | WEIGHT: 290 LBS

## 2019-07-01 DIAGNOSIS — I10 ESSENTIAL HYPERTENSION: ICD-10-CM

## 2019-07-01 DIAGNOSIS — Z13.31 POSITIVE DEPRESSION SCREENING: ICD-10-CM

## 2019-07-01 DIAGNOSIS — F33.1 MODERATE EPISODE OF RECURRENT MAJOR DEPRESSIVE DISORDER (HCC): Primary | ICD-10-CM

## 2019-07-01 DIAGNOSIS — E55.9 VITAMIN D DEFICIENCY: ICD-10-CM

## 2019-07-01 DIAGNOSIS — K21.9 GASTROESOPHAGEAL REFLUX DISEASE WITHOUT ESOPHAGITIS: ICD-10-CM

## 2019-07-01 DIAGNOSIS — M79.10 MYALGIA: ICD-10-CM

## 2019-07-01 DIAGNOSIS — Z12.12 SCREENING FOR MALIGNANT NEOPLASM OF THE RECTUM: ICD-10-CM

## 2019-07-01 DIAGNOSIS — Z12.11 SCREEN FOR COLON CANCER: ICD-10-CM

## 2019-07-01 DIAGNOSIS — M25.50 ARTHRALGIA, UNSPECIFIED JOINT: ICD-10-CM

## 2019-07-01 PROCEDURE — G8431 POS CLIN DEPRES SCRN F/U DOC: HCPCS | Performed by: NURSE PRACTITIONER

## 2019-07-01 PROCEDURE — G0444 DEPRESSION SCREEN ANNUAL: HCPCS | Performed by: NURSE PRACTITIONER

## 2019-07-01 PROCEDURE — 36415 COLL VENOUS BLD VENIPUNCTURE: CPT | Performed by: NURSE PRACTITIONER

## 2019-07-01 PROCEDURE — 99215 OFFICE O/P EST HI 40 MIN: CPT | Performed by: NURSE PRACTITIONER

## 2019-07-01 RX ORDER — CHOLECALCIFEROL (VITAMIN D3) 1250 MCG
1 CAPSULE ORAL WEEKLY
Qty: 4 CAPSULE | Refills: 5 | Status: SHIPPED | OUTPATIENT
Start: 2019-07-01 | End: 2020-02-17 | Stop reason: SDUPTHER

## 2019-07-01 RX ORDER — HYDROCHLOROTHIAZIDE 12.5 MG/1
CAPSULE, GELATIN COATED ORAL
Qty: 30 CAPSULE | Refills: 5 | Status: SHIPPED | OUTPATIENT
Start: 2019-07-01 | End: 2019-09-03 | Stop reason: DRUGHIGH

## 2019-07-01 ASSESSMENT — PATIENT HEALTH QUESTIONNAIRE - PHQ9
8. MOVING OR SPEAKING SO SLOWLY THAT OTHER PEOPLE COULD HAVE NOTICED. OR THE OPPOSITE, BEING SO FIGETY OR RESTLESS THAT YOU HAVE BEEN MOVING AROUND A LOT MORE THAN USUAL: 1
9. THOUGHTS THAT YOU WOULD BE BETTER OFF DEAD, OR OF HURTING YOURSELF: 0
3. TROUBLE FALLING OR STAYING ASLEEP: 3
SUM OF ALL RESPONSES TO PHQ QUESTIONS 1-9: 13
SUM OF ALL RESPONSES TO PHQ9 QUESTIONS 1 & 2: 2
2. FEELING DOWN, DEPRESSED OR HOPELESS: 1
1. LITTLE INTEREST OR PLEASURE IN DOING THINGS: 1
5. POOR APPETITE OR OVEREATING: 3
4. FEELING TIRED OR HAVING LITTLE ENERGY: 2
6. FEELING BAD ABOUT YOURSELF - OR THAT YOU ARE A FAILURE OR HAVE LET YOURSELF OR YOUR FAMILY DOWN: 1
SUM OF ALL RESPONSES TO PHQ QUESTIONS 1-9: 13
7. TROUBLE CONCENTRATING ON THINGS, SUCH AS READING THE NEWSPAPER OR WATCHING TELEVISION: 1
10. IF YOU CHECKED OFF ANY PROBLEMS, HOW DIFFICULT HAVE THESE PROBLEMS MADE IT FOR YOU TO DO YOUR WORK, TAKE CARE OF THINGS AT HOME, OR GET ALONG WITH OTHER PEOPLE: 0

## 2019-07-01 NOTE — PROGRESS NOTES
that other people could have noticed. Or the opposite - being so fidgety or restless that you have been moving around a lot more than usual 1 1 - 1 - 0   Thoughts that you would be better off dead, or of hurting yourself in some way 0 0 - 0 - 0   PHQ-2 Score 2 2 1 6 0 3   PHQ-9 Total Score 13 9 1 18 0 18   If you checked off any problems, how difficult have these problems made it for you to do your work, take care of things at home, or get along with other people? 0 0 - 0 - 3     Interpretation of Total Score Total Score Depression Severity: 1-4 = Minimal depression, 5-9 = Mild depression, 10-14 = Moderate depression, 15-19 = Moderately severe depression, 20-27 = Severe depression    Review of Systems   Constitutional: Positive for fatigue. Psychiatric/Behavioral: Positive for decreased concentration, dysphoric mood and sleep disturbance. Negative for hallucinations, self-injury and suicidal ideas. The patient is nervous/anxious.          Past Medical History:   Diagnosis Date    Back pain     Hypertension     Plantar fasciitis      Family History   Problem Relation Age of Onset    Diabetes Mother     High Blood Pressure Mother     High Blood Pressure Father     High Blood Pressure Sister      Past Surgical History:   Procedure Laterality Date     SECTION       Social History     Socioeconomic History    Marital status:      Spouse name: Not on file    Number of children: Not on file    Years of education: Not on file    Highest education level: Not on file   Occupational History    Not on file   Social Needs    Financial resource strain: Not on file    Food insecurity:     Worry: Not on file     Inability: Not on file    Transportation needs:     Medical: Not on file     Non-medical: Not on file   Tobacco Use    Smoking status: Former Smoker     Packs/day: 0.50     Types: Cigarettes    Smokeless tobacco: Never Used   Substance and Sexual Activity    Alcohol use: No    Drug use: /78 (Site: Right Upper Arm, Position: Sitting, Cuff Size: Large Adult)   Pulse 78   Ht 5' 3\" (1.6 m)   Wt 290 lb (131.5 kg)   LMP 04/10/2012 (Exact Date)   SpO2 98%   BMI 51.37 kg/m²   Body mass index is 51.37 kg/m². Wt Readings from Last 3 Encounters:   07/01/19 290 lb (131.5 kg)   03/08/19 289 lb (131.1 kg)   01/16/19 283 lb (128.4 kg)       Physical Exam    Lab Review   No visits with results within 2 Month(s) from this visit. Latest known visit with results is:   Office Visit on 03/08/2019   Component Date Value    Glucose, UA POC 03/08/2019 neg     Bilirubin, UA 03/08/2019 neg     Ketones, UA 03/08/2019 neg     Spec Grav, UA 03/08/2019 1.015     Blood, UA POC 03/08/2019 trace     pH, UA 03/08/2019 7.0     Protein, UA POC 03/08/2019 neg     Urobilinogen, UA 03/08/2019 1.0     Leukocytes, UA 03/08/2019 small     Nitrite, UA 03/08/2019 pos     Organism 03/08/2019 Escherichia coli*    Urine Culture, Routine 03/08/2019 >100,000 CFU/ml        No results found for this visit on 07/01/19. Assessment:       1. Moderate episode of recurrent major depressive disorder ([address]    2. Myalgia    3. Arthralgia, unspecified joint    4. Essential hypertension    5. Gastroesophageal reflux disease without esophagitis    6. Vitamin D deficiency    7. Screening for malignant neoplasm of the rectum    8. Screen for colon cancer    9. Positive depression screening        No results found for this visit on 07/01/19. Plan:       Siddharth Parham was seen today for depression, anxiety, gastroesophageal reflux, other and hypertension. Diagnoses and all orders for this visit:    Moderate episode of recurrent major depressive disorder ([address]  Continue effexor xr 150 mg daily  Patient declines additional medication for unresolved symptoms. She declines counseling/psych  Educated if patient develops SI/HI/rambo to call 911 or go to ER.    Discussed use, benefit, risks and side effects of prescribed

## 2019-07-02 LAB
ANA INTERPRETATION: ABNORMAL
ANA TITER: ABNORMAL
ANTI-NUCLEAR ANTIBODY (ANA): POSITIVE
RHEUMATOID FACTOR: 11 IU/ML
SEDIMENTATION RATE, ERYTHROCYTE: 11 MM/HR (ref 0–30)
VITAMIN D 25-HYDROXY: 35.7 NG/ML

## 2019-07-12 DIAGNOSIS — R76.8 ELEVATED ANTINUCLEAR ANTIBODY (ANA) LEVEL: Primary | ICD-10-CM

## 2019-07-20 DIAGNOSIS — F41.8 DEPRESSION WITH ANXIETY: ICD-10-CM

## 2019-07-22 RX ORDER — VENLAFAXINE HYDROCHLORIDE 150 MG/1
CAPSULE, EXTENDED RELEASE ORAL
Qty: 30 CAPSULE | Refills: 2 | Status: SHIPPED | OUTPATIENT
Start: 2019-07-22 | End: 2019-10-30 | Stop reason: SDUPTHER

## 2019-09-03 ENCOUNTER — TELEPHONE (OUTPATIENT)
Dept: FAMILY MEDICINE CLINIC | Age: 54
End: 2019-09-03

## 2019-09-03 DIAGNOSIS — I10 ESSENTIAL HYPERTENSION: Primary | ICD-10-CM

## 2019-09-03 RX ORDER — HYDROCHLOROTHIAZIDE 12.5 MG/1
CAPSULE, GELATIN COATED ORAL
Qty: 60 CAPSULE | Refills: 0 | Status: SHIPPED
Start: 2019-09-03 | End: 2020-02-17 | Stop reason: SDUPTHER

## 2019-10-30 DIAGNOSIS — F41.8 DEPRESSION WITH ANXIETY: ICD-10-CM

## 2019-10-30 RX ORDER — VENLAFAXINE HYDROCHLORIDE 150 MG/1
CAPSULE, EXTENDED RELEASE ORAL
Qty: 30 CAPSULE | Refills: 2 | Status: SHIPPED | OUTPATIENT
Start: 2019-10-30 | End: 2020-02-03

## 2020-01-13 RX ORDER — LISINOPRIL 20 MG/1
TABLET ORAL
Qty: 30 TABLET | Refills: 4 | Status: SHIPPED | OUTPATIENT
Start: 2020-01-13 | End: 2020-06-15

## 2020-02-03 RX ORDER — VENLAFAXINE HYDROCHLORIDE 150 MG/1
CAPSULE, EXTENDED RELEASE ORAL
Qty: 30 CAPSULE | Refills: 0 | Status: SHIPPED | OUTPATIENT
Start: 2020-02-03 | End: 2020-02-17 | Stop reason: SDUPTHER

## 2020-02-17 ENCOUNTER — HOSPITAL ENCOUNTER (OUTPATIENT)
Dept: GENERAL RADIOLOGY | Age: 55
Discharge: HOME | End: 2020-02-17
Payer: COMMERCIAL

## 2020-02-17 ENCOUNTER — OFFICE VISIT (OUTPATIENT)
Dept: FAMILY MEDICINE CLINIC | Age: 55
End: 2020-02-17
Payer: COMMERCIAL

## 2020-02-17 ENCOUNTER — HOSPITAL ENCOUNTER (OUTPATIENT)
Age: 55
Discharge: HOME | End: 2020-02-17
Payer: COMMERCIAL

## 2020-02-17 VITALS
DIASTOLIC BLOOD PRESSURE: 74 MMHG | WEIGHT: 266 LBS | OXYGEN SATURATION: 93 % | BODY MASS INDEX: 47.13 KG/M2 | HEART RATE: 74 BPM | SYSTOLIC BLOOD PRESSURE: 104 MMHG | HEIGHT: 63 IN

## 2020-02-17 LAB
A/G RATIO: 1.6 (ref 1.1–2.2)
ALBUMIN SERPL-MCNC: 4.3 G/DL (ref 3.4–5)
ALP BLD-CCNC: 76 U/L (ref 40–129)
ALT SERPL-CCNC: 20 U/L (ref 10–40)
ANION GAP SERPL CALCULATED.3IONS-SCNC: 15 MMOL/L (ref 3–16)
AST SERPL-CCNC: 20 U/L (ref 15–37)
BASOPHILS ABSOLUTE: 0 K/UL (ref 0–0.2)
BASOPHILS RELATIVE PERCENT: 0.5 %
BILIRUB SERPL-MCNC: <0.2 MG/DL (ref 0–1)
BUN BLDV-MCNC: 11 MG/DL (ref 7–20)
CALCIUM SERPL-MCNC: 9.5 MG/DL (ref 8.3–10.6)
CHLORIDE BLD-SCNC: 102 MMOL/L (ref 99–110)
CHOLESTEROL, TOTAL: 137 MG/DL (ref 0–199)
CO2: 25 MMOL/L (ref 21–32)
CREAT SERPL-MCNC: <0.5 MG/DL (ref 0.6–1.1)
EOSINOPHILS ABSOLUTE: 0.2 K/UL (ref 0–0.6)
EOSINOPHILS RELATIVE PERCENT: 3.3 %
GFR AFRICAN AMERICAN: >60
GFR NON-AFRICAN AMERICAN: >60
GLOBULIN: 2.7 G/DL
GLUCOSE BLD-MCNC: 104 MG/DL (ref 70–99)
HCT VFR BLD CALC: 40.2 % (ref 36–48)
HDLC SERPL-MCNC: 40 MG/DL (ref 40–60)
HEMOGLOBIN: 13.3 G/DL (ref 12–16)
LDL CHOLESTEROL CALCULATED: 60 MG/DL
LYMPHOCYTES ABSOLUTE: 1.7 K/UL (ref 1–5.1)
LYMPHOCYTES RELATIVE PERCENT: 23.1 %
MCH RBC QN AUTO: 29.6 PG (ref 26–34)
MCHC RBC AUTO-ENTMCNC: 33 G/DL (ref 31–36)
MCV RBC AUTO: 89.8 FL (ref 80–100)
MONOCYTES ABSOLUTE: 0.9 K/UL (ref 0–1.3)
MONOCYTES RELATIVE PERCENT: 12.2 %
NEUTROPHILS ABSOLUTE: 4.5 K/UL (ref 1.7–7.7)
NEUTROPHILS RELATIVE PERCENT: 60.9 %
PDW BLD-RTO: 14.6 % (ref 12.4–15.4)
PLATELET # BLD: 183 K/UL (ref 135–450)
PMV BLD AUTO: 11.3 FL (ref 5–10.5)
POTASSIUM SERPL-SCNC: 4.1 MMOL/L (ref 3.5–5.1)
RBC # BLD: 4.48 M/UL (ref 4–5.2)
SODIUM BLD-SCNC: 142 MMOL/L (ref 136–145)
TOTAL PROTEIN: 7 G/DL (ref 6.4–8.2)
TRIGL SERPL-MCNC: 184 MG/DL (ref 0–150)
TSH SERPL DL<=0.05 MIU/L-ACNC: 1.52 UIU/ML (ref 0.27–4.2)
VITAMIN D 25-HYDROXY: 29.9 NG/ML
VLDLC SERPL CALC-MCNC: 37 MG/DL
WBC # BLD: 7.4 K/UL (ref 4–11)

## 2020-02-17 PROCEDURE — 73630 X-RAY EXAM OF FOOT: CPT

## 2020-02-17 PROCEDURE — 36415 COLL VENOUS BLD VENIPUNCTURE: CPT | Performed by: NURSE PRACTITIONER

## 2020-02-17 PROCEDURE — 99215 OFFICE O/P EST HI 40 MIN: CPT | Performed by: NURSE PRACTITIONER

## 2020-02-17 PROCEDURE — G0444 DEPRESSION SCREEN ANNUAL: HCPCS | Performed by: NURSE PRACTITIONER

## 2020-02-17 RX ORDER — HYDROCHLOROTHIAZIDE 12.5 MG/1
CAPSULE, GELATIN COATED ORAL
Qty: 60 CAPSULE | Refills: 5 | Status: SHIPPED | OUTPATIENT
Start: 2020-02-17 | End: 2020-03-04

## 2020-02-17 RX ORDER — VENLAFAXINE HYDROCHLORIDE 150 MG/1
CAPSULE, EXTENDED RELEASE ORAL
Qty: 30 CAPSULE | Refills: 5 | Status: SHIPPED | OUTPATIENT
Start: 2020-02-17 | End: 2020-09-02

## 2020-02-17 RX ORDER — CHOLECALCIFEROL (VITAMIN D3) 1250 MCG
1 CAPSULE ORAL WEEKLY
Qty: 4 CAPSULE | Refills: 5 | Status: SHIPPED | OUTPATIENT
Start: 2020-02-17 | End: 2020-10-05

## 2020-02-17 ASSESSMENT — PATIENT HEALTH QUESTIONNAIRE - PHQ9
1. LITTLE INTEREST OR PLEASURE IN DOING THINGS: 1
2. FEELING DOWN, DEPRESSED OR HOPELESS: 0
SUM OF ALL RESPONSES TO PHQ QUESTIONS 1-9: 5
SUM OF ALL RESPONSES TO PHQ QUESTIONS 1-9: 5
5. POOR APPETITE OR OVEREATING: 1
4. FEELING TIRED OR HAVING LITTLE ENERGY: 1
3. TROUBLE FALLING OR STAYING ASLEEP: 2
SUM OF ALL RESPONSES TO PHQ9 QUESTIONS 1 & 2: 1
7. TROUBLE CONCENTRATING ON THINGS, SUCH AS READING THE NEWSPAPER OR WATCHING TELEVISION: 0
8. MOVING OR SPEAKING SO SLOWLY THAT OTHER PEOPLE COULD HAVE NOTICED. OR THE OPPOSITE, BEING SO FIGETY OR RESTLESS THAT YOU HAVE BEEN MOVING AROUND A LOT MORE THAN USUAL: 0
10. IF YOU CHECKED OFF ANY PROBLEMS, HOW DIFFICULT HAVE THESE PROBLEMS MADE IT FOR YOU TO DO YOUR WORK, TAKE CARE OF THINGS AT HOME, OR GET ALONG WITH OTHER PEOPLE: 1
9. THOUGHTS THAT YOU WOULD BE BETTER OFF DEAD, OR OF HURTING YOURSELF: 0
6. FEELING BAD ABOUT YOURSELF - OR THAT YOU ARE A FAILURE OR HAVE LET YOURSELF OR YOUR FAMILY DOWN: 0

## 2020-02-17 NOTE — PROGRESS NOTES
Subjective:     Patient Name: Javan Heredia is a 47 y.o. female. Chief Complaint   Patient presents with    Anxiety     pt states the medicaiton is helping     Depression    Hypertension     pt checks bp at work , pt said her reading are good, pt is taking medication as prescribed, pt had no chest pain ,sob or swelling     Other     vit d        HPI   The patient also complains of an injury to her right foot fourth toe that occurred approximately 10 days ago. She states that she stubbed her foot against an object and she has had pain swelling and bruising ever since. The patient states that is unchanged. She has not tried anything to help the problem. Hypertension:  Home blood pressure monitoring: Yes - 120's/60-70's. She is adherent to a low sodium diet. Patient denies chest pain, shortness of breath, headache, lightheadedness, blurred vision, peripheral edema, palpitations, dry cough and fatigue. Antihypertensive medication side effects: no medication side effects noted. Use of agents associated with hypertension: NSAIDS. Sodium (mmol/L)   Date Value   01/16/2019 144    BUN (mg/dL)   Date Value   01/16/2019 15    Glucose (mg/dL)   Date Value   01/16/2019 93      Potassium (mmol/L)   Date Value   01/16/2019 4.3    CREATININE (mg/dL)   Date Value   01/16/2019 <0.5 (L)         BP Readings from Last 3 Encounters:   07/01/19 132/78   03/08/19 112/80   01/16/19 102/78       Hyperlipidemia:  Not on medication . Patient is  following a low fat, low cholesterol diet. She is  exercising regularly.      Lab Results   Component Value Date    CHOL 183 11/02/2017    TRIG 219 (H) 11/02/2017    HDL 58 11/02/2017    LDLCALC 81 11/02/2017     Lab Results   Component Value Date    ALT 21 01/16/2019    AST 22 01/16/2019      The 10-year ASCVD risk score (Sean Page, et al., 2013) is: 2.2%    Values used to calculate the score:      Age: 47 years      Sex: Female      Is SECTION       Social History     Socioeconomic History    Marital status:      Spouse name: Not on file    Number of children: Not on file    Years of education: Not on file    Highest education level: Not on file   Occupational History    Not on file   Social Needs    Financial resource strain: Not on file    Food insecurity:     Worry: Not on file     Inability: Not on file    Transportation needs:     Medical: Not on file     Non-medical: Not on file   Tobacco Use    Smoking status: Former Smoker     Packs/day: 0.50     Types: Cigarettes    Smokeless tobacco: Never Used   Substance and Sexual Activity    Alcohol use: No    Drug use: No    Sexual activity: Yes     Partners: Male   Lifestyle    Physical activity:     Days per week: Not on file     Minutes per session: Not on file    Stress: Not on file   Relationships    Social connections:     Talks on phone: Not on file     Gets together: Not on file     Attends Sabianist service: Not on file     Active member of club or organization: Not on file     Attends meetings of clubs or organizations: Not on file     Relationship status: Not on file    Intimate partner violence:     Fear of current or ex partner: Not on file     Emotionally abused: Not on file     Physically abused: Not on file     Forced sexual activity: Not on file   Other Topics Concern    Not on file   Social History Narrative    Not on file     Current Outpatient Medications   Medication Sig Dispense Refill    venlafaxine (EFFEXOR XR) 150 MG extended release capsule TAKE ONE CAPSULE BY MOUTH DAILY 30 capsule 0    lisinopril (PRINIVIL;ZESTRIL) 20 MG tablet TAKE ONE TABLET BY MOUTH DAILY 30 tablet 4    hydrochlorothiazide (MICROZIDE) 12.5 MG capsule TAKE TWO CAPSULES BY MOUTH DAILY 60 capsule 0    Cholecalciferol (VITAMIN D3) 10068 units CAPS Take 1 capsule by mouth once a week 4 capsule 5    omeprazole (PRILOSEC) 40 MG delayed release capsule TAKE ONE CAPSULE BY MOUTH DAILY 30 capsule 2     No current facility-administered medications for this visit. No changes in past medical history, past surgical history, social history, orfamily history were noted during the patient encounter unless specifically listed above. All updates of past medical history, past surgical history, social history, or family history were reviewed personally by me duringthe office visit. All problems listed in the assessment are stable unless noted otherwise. Medication profile reviewed personally by me during the office visit. Medication side effects and possible impairments frommedications were discussed as applicable. Objective:     /74 (Site: Left Upper Arm, Position: Sitting, Cuff Size: Large Adult)   Pulse 74   Ht 5' 3\" (1.6 m)   Wt 266 lb (120.7 kg)   LMP 04/10/2012 (Exact Date)   SpO2 93%   BMI 47.12 kg/m²   Body mass index is 47.12 kg/m². Wt Readings from Last 3 Encounters:   02/17/20 266 lb (120.7 kg)   07/01/19 290 lb (131.5 kg)   03/08/19 289 lb (131.1 kg)       Physical Exam  Vitals signs and nursing note reviewed. Constitutional:       General: She is not in acute distress. Appearance: Normal appearance. She is well-developed. HENT:      Head: Normocephalic and atraumatic. Right Ear: Tympanic membrane, ear canal and external ear normal.      Left Ear: Tympanic membrane, ear canal and external ear normal.      Nose: Nose normal.      Mouth/Throat:      Pharynx: Uvula midline. No oropharyngeal exudate. Eyes:      General: Lids are normal.      Conjunctiva/sclera: Conjunctivae normal.      Pupils: Pupils are equal, round, and reactive to light. Neck:      Musculoskeletal: Normal range of motion and neck supple. Thyroid: No thyromegaly. Vascular: No carotid bruit or JVD. Cardiovascular:      Rate and Rhythm: Normal rate and regular rhythm. Pulses: Normal pulses. Radial pulses are 2+ on the right side and 2+ on the left side.

## 2020-02-18 ASSESSMENT — ENCOUNTER SYMPTOMS
ALLERGIC/IMMUNOLOGIC NEGATIVE: 1
GASTROINTESTINAL NEGATIVE: 1
COLOR CHANGE: 1
EYES NEGATIVE: 1
RESPIRATORY NEGATIVE: 1

## 2020-02-26 ENCOUNTER — TELEPHONE (OUTPATIENT)
Dept: FAMILY MEDICINE CLINIC | Age: 55
End: 2020-02-26

## 2020-03-04 RX ORDER — HYDROCHLOROTHIAZIDE 12.5 MG/1
CAPSULE, GELATIN COATED ORAL
Qty: 30 CAPSULE | Refills: 4 | Status: SHIPPED | OUTPATIENT
Start: 2020-03-04 | End: 2020-10-23 | Stop reason: ALTCHOICE

## 2020-09-02 RX ORDER — VENLAFAXINE HYDROCHLORIDE 150 MG/1
CAPSULE, EXTENDED RELEASE ORAL
Qty: 30 CAPSULE | Refills: 4 | Status: SHIPPED | OUTPATIENT
Start: 2020-09-02 | End: 2021-01-19

## 2020-09-30 RX ORDER — LISINOPRIL 20 MG/1
TABLET ORAL
Qty: 30 TABLET | Refills: 0 | Status: SHIPPED | OUTPATIENT
Start: 2020-09-30 | End: 2020-10-23 | Stop reason: ALTCHOICE

## 2020-09-30 NOTE — TELEPHONE ENCOUNTER
Subject: Refill Request     QUESTIONS   Name of Medication? lisinopril (PRINIVIL;ZESTRIL) 20 MG tablet   Patient-reported dosage and instructions? 20 MG 1 per day   How many days do you have left? 0   Preferred Pharmacy? [address]   Pharmacy phone number (if available)? [phone]   Additional Information for Provider?   ---------------------------------------------------------------------------   --------------   [address]   What is the best way for the office to contact you?  OK to leave message on   voicemail    OK to respond with secure message via [email or web address] portal (only for patients   who have registered [email or web address] account)   Preferred Call Back Phone Number? [phone]

## 2020-10-23 ENCOUNTER — VIRTUAL VISIT (OUTPATIENT)
Dept: FAMILY MEDICINE CLINIC | Age: 55
End: 2020-10-23
Payer: COMMERCIAL

## 2020-10-23 PROCEDURE — 99442 PR PHYS/QHP TELEPHONE EVALUATION 11-20 MIN: CPT | Performed by: NURSE PRACTITIONER

## 2020-10-23 PROCEDURE — G0444 DEPRESSION SCREEN ANNUAL: HCPCS | Performed by: NURSE PRACTITIONER

## 2020-10-23 RX ORDER — TRAZODONE HYDROCHLORIDE 50 MG/1
50 TABLET ORAL NIGHTLY PRN
Qty: 30 TABLET | Refills: 1 | Status: SHIPPED | OUTPATIENT
Start: 2020-10-23 | End: 2022-03-17 | Stop reason: SDUPTHER

## 2020-10-23 RX ORDER — LISINOPRIL AND HYDROCHLOROTHIAZIDE 20; 12.5 MG/1; MG/1
1 TABLET ORAL DAILY
Qty: 90 TABLET | Refills: 1 | Status: SHIPPED | OUTPATIENT
Start: 2020-10-23 | End: 2021-03-15 | Stop reason: DRUGHIGH

## 2020-10-23 ASSESSMENT — PATIENT HEALTH QUESTIONNAIRE - PHQ9
1. LITTLE INTEREST OR PLEASURE IN DOING THINGS: 0
2. FEELING DOWN, DEPRESSED OR HOPELESS: 1
SUM OF ALL RESPONSES TO PHQ QUESTIONS 1-9: 6
SUM OF ALL RESPONSES TO PHQ QUESTIONS 1-9: 6
9. THOUGHTS THAT YOU WOULD BE BETTER OFF DEAD, OR OF HURTING YOURSELF: 0
SUM OF ALL RESPONSES TO PHQ QUESTIONS 1-9: 6
5. POOR APPETITE OR OVEREATING: 2
3. TROUBLE FALLING OR STAYING ASLEEP: 1
SUM OF ALL RESPONSES TO PHQ9 QUESTIONS 1 & 2: 1
8. MOVING OR SPEAKING SO SLOWLY THAT OTHER PEOPLE COULD HAVE NOTICED. OR THE OPPOSITE, BEING SO FIGETY OR RESTLESS THAT YOU HAVE BEEN MOVING AROUND A LOT MORE THAN USUAL: 0
10. IF YOU CHECKED OFF ANY PROBLEMS, HOW DIFFICULT HAVE THESE PROBLEMS MADE IT FOR YOU TO DO YOUR WORK, TAKE CARE OF THINGS AT HOME, OR GET ALONG WITH OTHER PEOPLE: 1
7. TROUBLE CONCENTRATING ON THINGS, SUCH AS READING THE NEWSPAPER OR WATCHING TELEVISION: 1
6. FEELING BAD ABOUT YOURSELF - OR THAT YOU ARE A FAILURE OR HAVE LET YOURSELF OR YOUR FAMILY DOWN: 0
4. FEELING TIRED OR HAVING LITTLE ENERGY: 1

## 2020-10-23 ASSESSMENT — ENCOUNTER SYMPTOMS
SHORTNESS OF BREATH: 0
EYES NEGATIVE: 1
ALLERGIC/IMMUNOLOGIC NEGATIVE: 1
NAUSEA: 0
ABDOMINAL PAIN: 0
ANAL BLEEDING: 0
GASTROINTESTINAL NEGATIVE: 1
RESPIRATORY NEGATIVE: 1
BLOOD IN STOOL: 0

## 2020-10-23 NOTE — PATIENT INSTRUCTIONS
Patient Education        High Blood Pressure: Care Instructions  Overview     It's normal for blood pressure to go up and down throughout the day. But if it stays up, you have high blood pressure. Another name for high blood pressure is hypertension. Despite what a lot of people think, high blood pressure usually doesn't cause headaches or make you feel dizzy or lightheaded. It usually has no symptoms. But it does increase your risk of stroke, heart attack, and other problems. You and your doctor will talk about your risks of these problems based on your blood pressure. Your doctor will give you a goal for your blood pressure. Your goal will be based on your health and your age. Lifestyle changes, such as eating healthy and being active, are always important to help lower blood pressure. You might also take medicine to reach your blood pressure goal.  Follow-up care is a key part of your treatment and safety. Be sure to make and go to all appointments, and call your doctor if you are having problems. It's also a good idea to know your test results and keep a list of the medicines you take. How can you care for yourself at home? Medical treatment  · If you stop taking your medicine, your blood pressure will go back up. You may take one or more types of medicine to lower your blood pressure. Be safe with medicines. Take your medicine exactly as prescribed. Call your doctor if you think you are having a problem with your medicine. · Talk to your doctor before you start taking aspirin every day. Aspirin can help certain people lower their risk of a heart attack or stroke. But taking aspirin isn't right for everyone, because it can cause serious bleeding. · See your doctor regularly. You may need to see the doctor more often at first or until your blood pressure comes down.   · If you are taking blood pressure medicine, talk to your doctor before you take decongestants or anti-inflammatory medicine, such as ibuprofen. Some of these medicines can raise blood pressure. · Learn how to check your blood pressure at home. Lifestyle changes  · Stay at a healthy weight. This is especially important if you put on weight around the waist. Losing even 10 pounds can help you lower your blood pressure. · If your doctor recommends it, get more exercise. Walking is a good choice. Bit by bit, increase the amount you walk every day. Try for at least 30 minutes on most days of the week. You also may want to swim, bike, or do other activities. · Avoid or limit alcohol. Talk to your doctor about whether you can drink any alcohol. · Try to limit how much sodium you eat to less than 2,300 milligrams (mg) a day. Your doctor may ask you to try to eat less than 1,500 mg a day. · Eat plenty of fruits (such as bananas and oranges), vegetables, legumes, whole grains, and low-fat dairy products. · Lower the amount of saturated fat in your diet. Saturated fat is found in animal products such as milk, cheese, and meat. Limiting these foods may help you lose weight and also lower your risk for heart disease. · Do not smoke. Smoking increases your risk for heart attack and stroke. If you need help quitting, talk to your doctor about stop-smoking programs and medicines. These can increase your chances of quitting for good. When should you call for help? Call  911 anytime you think you may need emergency care. This may mean having symptoms that suggest that your blood pressure is causing a serious heart or blood vessel problem. Your blood pressure may be over 180/120. For example, call 911 if:    · You have symptoms of a heart attack. These may include:  ? Chest pain or pressure, or a strange feeling in the chest.  ? Sweating. ? Shortness of breath. ? Nausea or vomiting. ? Pain, pressure, or a strange feeling in the back, neck, jaw, or upper belly or in one or both shoulders or arms. ? Lightheadedness or sudden weakness.   ? A fast or

## 2020-10-23 NOTE — PROGRESS NOTES
[address] [facility]  [address] [place] [address]  Dept: [phone]  Dept Fax: [phone]  Loc: [phone]    Catarino Gomes is a 54 y.o. female evaluated via telephone on 10/23/2020. Consent:  She and/or health care decision maker is aware that that she may receive a bill for this telephone service, depending on her insurance coverage, and has provided verbal consent to proceed: Yes    Catarino Gomes is a 54 y.o. female who presents today for her medical conditions/complaints as noted below. Caatrino Gomes is c/o of Depression (pt feels her depression is pretty well controlled. pt takes medication every day.); Hypertension (pt occasionally checks at home and it runs around 132/64. pt takes medication every day. pt denies cp, sob, edema or headaches.); and Gastroesophageal Reflux (pt feels her acid reflux is a lot better. pt takes medication as needed. )        [address] [facility]  [address] [place] [address]  Dept: [phone]  Dept Fax: [phone]  Loc: [phone]    Catarino Gomes is a 54 y.o. female who presents today for her medical conditions/complaints as noted below. Catarino Gomes is c/o of Depression (pt feels her depression is pretty well controlled. pt takes medication every day.); Hypertension (pt occasionally checks at home and it runs around 132/64. pt takes medication every day. pt denies cp, sob, edema or headaches.); and Gastroesophageal Reflux (pt feels her acid reflux is a lot better. pt takes medication as needed. )       Subjective:     Chief Complaint   Patient presents with    Depression     pt feels her depression is pretty well controlled. pt takes medication every day.  Hypertension     pt occasionally checks at home and it runs around 132/64. pt takes medication every day. pt denies cp, sob, edema or headaches.     Gastroesophageal Reflux     pt feels her acid reflux is a lot better. pt takes medication as needed. HPI   Hypertension:    The patient is here for routine follow up on HTN. Patient denies chest pain, shortness of breath, headache, lightheadedness, blurred vision, peripheral edema, palpitations, dry cough, and fatigue. She is adherent to a low sodium diet. Patient denies antihypertensive medication side effects of: fatigue, dry cough, swelling in ankles, weakness, orthostatic lightheadedness, myalgias, rash, nausea, abdominal discomfort, headaches, insomnia, weight gain, palpitations, slow heart rate, excessive urination, depression, and wheezing. Blood pressure typically runs 130/60 outside of the office. Sodium (mmol/L)   Date Value   02/17/2020 142    BUN (mg/dL)   Date Value   02/17/2020 11    Glucose (mg/dL)   Date Value   02/17/2020 104 (H)      Potassium (mmol/L)   Date Value   02/17/2020 4.1    CREATININE (mg/dL)   Date Value   02/17/2020 <0.5 (L)         BP Readings from Last 3 Encounters:   02/17/20 104/74   07/01/19 132/78   03/08/19 112/80       GERD  Carlos is here for follow up of heartburn. Symptoms have been present for awhile and is a chronic condition. Currently treated with lifestyle change including dietary changes, NPO after supper, weight loss, proton pump inhibitor: prilosec, which has been effective. She has been using this therapy 2-3 times monthly. Patient denies dysphagia, cough, hoarseness, chest pain, unintentional weight loss, N/V, bloating, early satiety, abdominal pain or melena, hematochezia, hematemesis, and coffee ground emesis. .        Vitamin D Deficiency  Patient is here to follow up on vitamin D deficiency. Patient is taking vitamin D supplement without any adverse effects as prescribed.     Lab Results   Component Value Date    VITD25 29.9 (L) 02/17/2020         Insomnia:  Current treatment: avoiding heavy meal before bedtime, avoiding long naps during the day, avoiding use of electronic devices before bedtime, avoiding vigorous physical exercise prior to bed, decreasing caffeine consumption, going to sleep at the same time each night and melatonin use, which has been effective. Medication side effects: none. Mood Disorder:  Patient presents for follow-up of depression and anxiety disorder. Current complaints include: See PHQ9 below . She denies tearfulness, decreased libido, irritability, excessive worry, panic attacks, obsessive thoughts, compulsive behaviors, increased use of drugs or alcohol, suicidal thoughts or behavior, and impaired memory. Symptoms/signs of rambo: none. External stressors: nothing new. Current treatment includes: Effexor- xr 150 mg daily. Medication side effects: none. PHQ-9  10/23/2020 2/17/2020 7/1/2019 3/8/2019 1/16/2019 8/7/2017 6/16/2017   Little interest or pleasure in doing things 0 1 1 1 0 3 0   Feeling down, depressed, or hopeless 1 0 1 1 1 3 0   Trouble falling or staying asleep, or sleeping too much 1 2 3 2 - 3 -   Feeling tired or having little energy 1 1 2 1 - 3 -   Poor appetite or overeating 2 1 3 1 - 3 -   Feeling bad about yourself - or that you are a failure or have let yourself or your family down 0 0 1 1 - 1 -   Trouble concentrating on things, such as reading the newspaper or watching television 1 0 1 1 - 1 -   Moving or speaking so slowly that other people could have noticed. Or the opposite - being so fidgety or restless that you have been moving around a lot more than usual 0 0 1 1 - 1 -   Thoughts that you would be better off dead, or of hurting yourself in some way 0 0 0 0 - 0 -   PHQ-2 Score 1 1 2 2 1 6 0   PHQ-9 Total Score 6 5 13 9 1 18 0   If you checked off any problems, how difficult have these problems made it for you to do your work, take care of things at home, or get along with other people?  1 1 0 0 - 0 -     Interpretation of Total Score Total Score Depression Severity: 1-4 = Minimal depression, 5-9 = Mild depression, 10-14 = Moderate depression, 15-19 = Moderately severe depression, 20-27 = Severe depression      Past Medical History:   Diagnosis Date    Back pain     Hypertension     Plantar fasciitis          Review of Systems   Constitutional: Positive for appetite change and fatigue. Negative for unexpected weight change. HENT: Negative. Eyes: Negative. Respiratory: Negative. Negative for shortness of breath. Cardiovascular: Negative. Negative for chest pain, palpitations and leg swelling. Gastrointestinal: Negative. Negative for abdominal pain, anal bleeding, blood in stool and nausea. Endocrine: Negative. Genitourinary: Negative. Negative for hematuria. Musculoskeletal: Negative. Skin: Negative. Negative for rash. Allergic/Immunologic: Negative. Neurological: Negative. Negative for dizziness, syncope, light-headedness and numbness. Hematological: Negative. Does not bruise/bleed easily. Psychiatric/Behavioral: Positive for dysphoric mood and sleep disturbance. All other systems reviewed and are negative.        Past Medical History:   Diagnosis Date    Back pain     Hypertension     Plantar fasciitis      Family History   Problem Relation Age of Onset    Diabetes Mother     High Blood Pressure Mother     High Blood Pressure Father     High Blood Pressure Sister      Past Surgical History:   Procedure Laterality Date     SECTION       Social History     Socioeconomic History    Marital status:      Spouse name: Not on file    Number of children: Not on file    Years of education: Not on file    Highest education level: Not on file   Occupational History    Not on file   Social Needs    Financial resource strain: Not on file    Food insecurity     Worry: Not on file     Inability: Not on file    Transportation needs     Medical: Not on file     Non-medical: Not on file   Tobacco Use    Smoking status: Current Every Day Smoker     Packs/day: 0.50     Years: 20.00     Pack years: 10.00     Types: Cigarettes    Smokeless tobacco: Never Used   Substance and Sexual Activity    Alcohol use: No    Drug use: No    Sexual activity: Yes     Partners: Male   Lifestyle    Physical activity     Days per week: Not on file     Minutes per session: Not on file    Stress: Not on file   Relationships    Social connections     Talks on phone: Not on file     Gets together: Not on file     Attends Religion service: Not on file     Active member of club or organization: Not on file     Attends meetings of clubs or organizations: Not on file     Relationship status: Not on file    Intimate partner violence     Fear of current or ex partner: Not on file     Emotionally abused: Not on file     Physically abused: Not on file     Forced sexual activity: Not on file   Other Topics Concern    Not on file   Social History Narrative    Not on file     Current Outpatient Medications   Medication Sig Dispense Refill    D3-50 1.25 MG (28394 UT) CAPS TAKE ONE CAPSULE BY MOUTH ONCE WEEKLY 4 capsule 0    lisinopril (PRINIVIL;ZESTRIL) 20 MG tablet TAKE ONE TABLET BY MOUTH DAILY 30 tablet 0    venlafaxine (EFFEXOR XR) 150 MG extended release capsule TAKE ONE CAPSULE BY MOUTH DAILY 30 capsule 4    hydrochlorothiazide (MICROZIDE) 12.5 MG capsule TAKE ONE CAPSULE BY MOUTH DAILY 30 capsule 4    omeprazole (PRILOSEC) 40 MG delayed release capsule TAKE ONE CAPSULE BY MOUTH DAILY 30 capsule 2     No current facility-administered medications for this visit. No changes in past medical history, past surgical history, social history, orfamily history were noted during the patient encounter unless specifically listed above. All updates of past medical history, past surgical history, social history, or family history were reviewed personally by me duringthe office visit. All problems listed in the assessment are stable unless noted otherwise. Medication profile reviewed personally by me during the office visit. Medication side effects and possible impairments frommedications were discussed as applicable. Objective:     Physical Exam  Not applicable due to telephone visit      LMP 04/10/2012 (Exact Date)   There is no height or weight on file to calculate BMI. BP Readings from Last 2 Encounters:   02/17/20 104/74   07/01/19 132/78       Wt Readings from Last 3 Encounters:   02/17/20 266 lb (120.7 kg)   07/01/19 290 lb (131.5 kg)   03/08/19 289 lb (131.1 kg)       Lab Review   No visits with results within 2 Month(s) from this visit.    Latest known visit with results is:   Office Visit on 02/17/2020   Component Date Value    WBC 02/17/2020 7.4     RBC 02/17/2020 4.48     Hemoglobin 02/17/2020 13.3     Hematocrit 02/17/2020 40.2     MCV 02/17/2020 89.8     MCH 02/17/2020 29.6     MCHC 02/17/2020 33.0     RDW 02/17/2020 14.6     Platelets 59/35/1436 183     MPV 02/17/2020 11.3*    Neutrophils % 02/17/2020 60.9     Lymphocytes % 02/17/2020 23.1     Monocytes % 02/17/2020 12.2     Eosinophils % 02/17/2020 3.3     Basophils % 02/17/2020 0.5     Neutrophils Absolute 02/17/2020 4.5     Lymphocytes Absolute 02/17/2020 1.7     Monocytes Absolute 02/17/2020 0.9     Eosinophils Absolute 02/17/2020 0.2     Basophils Absolute 02/17/2020 0.0     Sodium 02/17/2020 142     Potassium 02/17/2020 4.1     Chloride 02/17/2020 102     CO2 02/17/2020 25     Anion Gap 02/17/2020 15     Glucose 02/17/2020 104*    BUN 02/17/2020 11     CREATININE 02/17/2020 <0.5*    GFR Non- 02/17/2020 >60     GFR  02/17/2020 >60     Calcium 02/17/2020 9.5     Total Protein 02/17/2020 7.0     Alb 02/17/2020 4.3     Albumin/Globulin Ratio 02/17/2020 1.6     Total Bilirubin 02/17/2020 <0.2     Alkaline Phosphatase 02/17/2020 76     ALT 02/17/2020 20     AST 02/17/2020 20     Globulin 02/17/2020 2.7     TSH 02/17/2020 1.52  Vit D, 25-Hydroxy 02/17/2020 29.9*    Cholesterol, Total 02/17/2020 137     Triglycerides 02/17/2020 184*    HDL 02/17/2020 40     LDL Calculated 02/17/2020 60     VLDL Cholesterol Calcula* 02/17/2020 37        No results found for this visit on 10/23/20. Assessment:       1. Mild episode of recurrent major depressive disorder ([address]    2. Essential hypertension    3. Primary insomnia    4. Anxiety    5. Gastroesophageal reflux disease without esophagitis    6. Vitamin D deficiency        No results found for this visit on 10/23/20. Plan:       Radha Lawson was seen today for depression, hypertension and gastroesophageal reflux. Diagnoses and all orders for this visit:    Mild episode of recurrent major depressive disorder [facility])  Patient still having some mild depression. Will use the trazodone to help with both the depression and the insomnia  Educated if patient develops SI/HI/rambo to call 911 or go to ER. Discussed use, benefit, risks and side effects of prescribed medications. Barriers to compliance discussed. All patient questions answered. Pt voiced understanding. Essential hypertension  -     lisinopril-hydroCHLOROthiazide (PRINZIDE;ZESTORETIC) 20-12.5 MG per tablet; Take 1 tablet by mouth daily  Hypertension, Blood pressure is  well controlled on current medication regimen. Medication: Patient is currently on lisinopril 20 and a separate HCTZ 12.5 mg.  I am going to combine the 2 medications into 1 pill for better compliance. Dietary sodium restriction. Regular aerobic exercise. Check blood pressures monthly and record. Primary insomnia  Trial of  -     traZODone (DESYREL) 50 MG tablet; Take 1 tablet by mouth nightly as needed for Sleep  Discussed sleep hygiene measures including regular sleep schedule, optimal sleep environment, and relaxing presleep rituals. Avoid daytime naps. Avoid caffeine after noon. Avoid excess alcohol.   Avoid tobacco.  Recommended daily exercise. Anxiety  -     traZODone (DESYREL) 50 MG tablet; Take 1 tablet by mouth nightly as needed for Sleep    Gastroesophageal reflux disease without esophagitis  The patient has made lifestyle modifications that include weight loss and dietary changes and now only has to use her omeprazole 2-3 times per month. Vitamin D deficiency  Discussed with patient that we make vitamin D from the sun and get it from some food sources, but it is very common to be deficient. Discussed the need for vitamin D replacement because low vitamin D can cause fatigue, joint aches and has been implicated in heart disease, bone disease like osteoporosis, and some other chronic illnesses. Patient will start/continue vitamin D supplement as per order. Recommend vitamin D to be rechecked in 6 months. Patient has been instructed call the office immediately with new symptoms, change in symptoms or worseningof symptoms. If this is not feasible, patient is instructed to report to the emergency room. Medication profile reviewed. Medication side effects and possible impairments from medications were discussed as applicable. Allergies were reviewed. Health maintenance was reviewed and updated as appropriate. Return in about 5 months (around 3/23/2021). (Comment: Please note this report has been produced using a combination of typing and speech recognition software and may contain errors related to that system including errors in grammar, punctuation, and spelling, as well as words and phrases that may be inappropriate. If there are any questions or concerns please feel free to contact the dictating provider for clarification.)      Documentation:  I communicated with the patient and/or health care decision maker about above conditions.    Details of this discussion including any medical advice provided: as noted above      I affirm this is a Patient Initiated Episode with a Patient who has not had a related appointment within my department in the past 7 days or scheduled within the next 24 hours.     Patient identification was verified at the start of the visit: Yes    Total Time: minutes: 11-20 minutes    Note: not billable if this call serves to triage the patient into an appointment for the relevant concern      Claire Stephenson

## 2021-01-19 DIAGNOSIS — F33.0 MILD EPISODE OF RECURRENT MAJOR DEPRESSIVE DISORDER (HCC): ICD-10-CM

## 2021-01-19 DIAGNOSIS — F41.8 DEPRESSION WITH ANXIETY: ICD-10-CM

## 2021-01-19 RX ORDER — VENLAFAXINE HYDROCHLORIDE 150 MG/1
CAPSULE, EXTENDED RELEASE ORAL
Qty: 30 CAPSULE | Refills: 3 | Status: SHIPPED | OUTPATIENT
Start: 2021-01-19 | End: 2021-05-20

## 2021-03-08 ENCOUNTER — NURSE ONLY (OUTPATIENT)
Dept: FAMILY MEDICINE CLINIC | Age: 56
End: 2021-03-08
Payer: COMMERCIAL

## 2021-03-08 DIAGNOSIS — E55.9 VITAMIN D DEFICIENCY: ICD-10-CM

## 2021-03-08 DIAGNOSIS — I10 ESSENTIAL HYPERTENSION: ICD-10-CM

## 2021-03-08 DIAGNOSIS — F41.9 ANXIETY: ICD-10-CM

## 2021-03-08 DIAGNOSIS — K21.9 GASTROESOPHAGEAL REFLUX DISEASE WITHOUT ESOPHAGITIS: ICD-10-CM

## 2021-03-08 DIAGNOSIS — Z13.220 SCREENING FOR CHOLESTEROL LEVEL: ICD-10-CM

## 2021-03-08 LAB
A/G RATIO: 1.4 (ref 1.1–2.2)
ALBUMIN SERPL-MCNC: 4.3 G/DL (ref 3.4–5)
ALP BLD-CCNC: 72 U/L (ref 40–129)
ALT SERPL-CCNC: 21 U/L (ref 10–40)
ANION GAP SERPL CALCULATED.3IONS-SCNC: 9 MMOL/L (ref 3–16)
AST SERPL-CCNC: 22 U/L (ref 15–37)
BASOPHILS ABSOLUTE: 0 K/UL (ref 0–0.2)
BASOPHILS RELATIVE PERCENT: 0.6 %
BILIRUB SERPL-MCNC: 0.3 MG/DL (ref 0–1)
BUN BLDV-MCNC: 15 MG/DL (ref 7–20)
CALCIUM SERPL-MCNC: 9.6 MG/DL (ref 8.3–10.6)
CHLORIDE BLD-SCNC: 103 MMOL/L (ref 99–110)
CHOLESTEROL, TOTAL: 157 MG/DL (ref 0–199)
CO2: 28 MMOL/L (ref 21–32)
CREAT SERPL-MCNC: 0.6 MG/DL (ref 0.6–1.1)
EOSINOPHILS ABSOLUTE: 0.2 K/UL (ref 0–0.6)
EOSINOPHILS RELATIVE PERCENT: 2.4 %
GFR AFRICAN AMERICAN: >60
GFR NON-AFRICAN AMERICAN: >60
GLOBULIN: 3.1 G/DL
GLUCOSE BLD-MCNC: 106 MG/DL (ref 70–99)
HCT VFR BLD CALC: 40.4 % (ref 36–48)
HDLC SERPL-MCNC: 43 MG/DL (ref 40–60)
HEMOGLOBIN: 13.5 G/DL (ref 12–16)
LDL CHOLESTEROL CALCULATED: 83 MG/DL
LYMPHOCYTES ABSOLUTE: 2.7 K/UL (ref 1–5.1)
LYMPHOCYTES RELATIVE PERCENT: 34.2 %
MCH RBC QN AUTO: 29.6 PG (ref 26–34)
MCHC RBC AUTO-ENTMCNC: 33.3 G/DL (ref 31–36)
MCV RBC AUTO: 89 FL (ref 80–100)
MONOCYTES ABSOLUTE: 0.8 K/UL (ref 0–1.3)
MONOCYTES RELATIVE PERCENT: 10.8 %
NEUTROPHILS ABSOLUTE: 4.1 K/UL (ref 1.7–7.7)
NEUTROPHILS RELATIVE PERCENT: 52 %
PDW BLD-RTO: 15 % (ref 12.4–15.4)
PLATELET # BLD: 233 K/UL (ref 135–450)
PMV BLD AUTO: 10.1 FL (ref 5–10.5)
POTASSIUM SERPL-SCNC: 4.4 MMOL/L (ref 3.5–5.1)
RBC # BLD: 4.54 M/UL (ref 4–5.2)
SODIUM BLD-SCNC: 140 MMOL/L (ref 136–145)
TOTAL PROTEIN: 7.4 G/DL (ref 6.4–8.2)
TRIGL SERPL-MCNC: 157 MG/DL (ref 0–150)
TSH SERPL DL<=0.05 MIU/L-ACNC: 1.88 UIU/ML (ref 0.27–4.2)
VITAMIN D 25-HYDROXY: 28.5 NG/ML
VLDLC SERPL CALC-MCNC: 31 MG/DL
WBC # BLD: 7.8 K/UL (ref 4–11)

## 2021-03-08 PROCEDURE — 36415 COLL VENOUS BLD VENIPUNCTURE: CPT | Performed by: NURSE PRACTITIONER

## 2021-03-15 ENCOUNTER — OFFICE VISIT (OUTPATIENT)
Dept: FAMILY MEDICINE CLINIC | Age: 56
End: 2021-03-15
Payer: COMMERCIAL

## 2021-03-15 VITALS
DIASTOLIC BLOOD PRESSURE: 88 MMHG | BODY MASS INDEX: 51.91 KG/M2 | SYSTOLIC BLOOD PRESSURE: 122 MMHG | WEIGHT: 293 LBS | TEMPERATURE: 97.1 F | HEIGHT: 63 IN

## 2021-03-15 DIAGNOSIS — I10 ESSENTIAL HYPERTENSION: ICD-10-CM

## 2021-03-15 DIAGNOSIS — Z12.31 ENCOUNTER FOR SCREENING MAMMOGRAM FOR MALIGNANT NEOPLASM OF BREAST: ICD-10-CM

## 2021-03-15 DIAGNOSIS — R73.9 HYPERGLYCEMIA: ICD-10-CM

## 2021-03-15 DIAGNOSIS — Z12.11 SCREENING FOR COLON CANCER: ICD-10-CM

## 2021-03-15 DIAGNOSIS — R40.0 DAYTIME SOMNOLENCE: ICD-10-CM

## 2021-03-15 DIAGNOSIS — R06.83 SNORING: ICD-10-CM

## 2021-03-15 DIAGNOSIS — E55.9 VITAMIN D DEFICIENCY: ICD-10-CM

## 2021-03-15 DIAGNOSIS — F33.1 MODERATE EPISODE OF RECURRENT MAJOR DEPRESSIVE DISORDER (HCC): Primary | ICD-10-CM

## 2021-03-15 DIAGNOSIS — Z13.31 POSITIVE DEPRESSION SCREENING: ICD-10-CM

## 2021-03-15 DIAGNOSIS — K21.9 GASTROESOPHAGEAL REFLUX DISEASE WITHOUT ESOPHAGITIS: ICD-10-CM

## 2021-03-15 DIAGNOSIS — F51.01 PRIMARY INSOMNIA: ICD-10-CM

## 2021-03-15 LAB
CONTROL: NORMAL
HBA1C MFR BLD: 4.7 %
HEMOCCULT STL QL: NEGATIVE

## 2021-03-15 PROCEDURE — 99215 OFFICE O/P EST HI 40 MIN: CPT | Performed by: NURSE PRACTITIONER

## 2021-03-15 PROCEDURE — 83036 HEMOGLOBIN GLYCOSYLATED A1C: CPT | Performed by: NURSE PRACTITIONER

## 2021-03-15 PROCEDURE — G8431 POS CLIN DEPRES SCRN F/U DOC: HCPCS | Performed by: NURSE PRACTITIONER

## 2021-03-15 RX ORDER — LISINOPRIL AND HYDROCHLOROTHIAZIDE 25; 20 MG/1; MG/1
1 TABLET ORAL DAILY
Qty: 30 TABLET | Refills: 3 | Status: SHIPPED | OUTPATIENT
Start: 2021-03-15 | End: 2021-07-19

## 2021-03-15 RX ORDER — METHOCARBAMOL 750 MG/1
TABLET ORAL
Qty: 4 CAPSULE | Refills: 5 | Status: SHIPPED | OUTPATIENT
Start: 2021-03-15 | End: 2022-02-09

## 2021-03-15 ASSESSMENT — PATIENT HEALTH QUESTIONNAIRE - PHQ9
6. FEELING BAD ABOUT YOURSELF - OR THAT YOU ARE A FAILURE OR HAVE LET YOURSELF OR YOUR FAMILY DOWN: 1
4. FEELING TIRED OR HAVING LITTLE ENERGY: 3
9. THOUGHTS THAT YOU WOULD BE BETTER OFF DEAD, OR OF HURTING YOURSELF: 0
SUM OF ALL RESPONSES TO PHQ QUESTIONS 1-9: 16
SUM OF ALL RESPONSES TO PHQ QUESTIONS 1-9: 16
2. FEELING DOWN, DEPRESSED OR HOPELESS: 1
7. TROUBLE CONCENTRATING ON THINGS, SUCH AS READING THE NEWSPAPER OR WATCHING TELEVISION: 2
5. POOR APPETITE OR OVEREATING: 3
SUM OF ALL RESPONSES TO PHQ9 QUESTIONS 1 & 2: 4
8. MOVING OR SPEAKING SO SLOWLY THAT OTHER PEOPLE COULD HAVE NOTICED. OR THE OPPOSITE, BEING SO FIGETY OR RESTLESS THAT YOU HAVE BEEN MOVING AROUND A LOT MORE THAN USUAL: 0

## 2021-03-15 ASSESSMENT — ENCOUNTER SYMPTOMS
ANAL BLEEDING: 0
CHOKING: 1
ABDOMINAL PAIN: 0
ALLERGIC/IMMUNOLOGIC NEGATIVE: 1
NAUSEA: 0
EYES NEGATIVE: 1
SHORTNESS OF BREATH: 0
BLOOD IN STOOL: 0
GASTROINTESTINAL NEGATIVE: 1

## 2021-03-15 NOTE — PROGRESS NOTES
Subjective:     Patient Name: Obinna Eng is a 54 y.o. female. Chief Complaint   Patient presents with    Anxiety    Depression    Hypertension     pt hadlabs drawn on 3/8/21    Other     vit d        HPI  Hypertension:  Home blood pressure monitoring: No.  She is adherent to a low sodium diet. Patient denies chest pain, shortness of breath, lightheadedness, blurred vision, palpitations and fatigue. Antihypertensive medication side effects: no medication side effects noted. Use of agents associated with hypertension: none and effexor. gained weight back and having increased edema and headaches x 3-4 weeks                                Sodium (mmol/L)   Date Value   03/08/2021 140    BUN (mg/dL)   Date Value   03/08/2021 15    Glucose (mg/dL)   Date Value   03/08/2021 106 (H)      Potassium (mmol/L)   Date Value   03/08/2021 4.4    CREATININE (mg/dL)   Date Value   03/08/2021 0.6         BP Readings from Last 3 Encounters:   03/15/21 122/88   02/17/20 104/74   07/01/19 132/78       Lab Results   Component Value Date    CHOL 157 03/08/2021    TRIG 157 (H) 03/08/2021    HDL 43 03/08/2021    LDLCALC 83 03/08/2021     Lab Results   Component Value Date    ALT 21 03/08/2021    AST 22 03/08/2021      The 10-year ASCVD risk score (Tiki Parks, et al., 2013) is: 5.8%    Values used to calculate the score:      Age: 54 years      Sex: Female      Is Non- : No      Diabetic: No      Tobacco smoker: Yes      Systolic Blood Pressure: 077 mmHg      Is BP treated: Yes      HDL Cholesterol: 43 mg/dL      Total Cholesterol: 157 mg/dL    Vitamin D Deficiency  Patient with vitamin d deficiency and currently on supplement without adverse reactions. Lab Results   Component Value Date    VITD25 28.5 (L) 03/08/2021     GERD  Paitent is here for follow up of heartburn. Symptoms have been present for awhile and is a chronic condition.   Currently treated with lifestyle change including dietary changes and omeprazole 40 mg as needed, which has been effective. She has been using this therapy Approximately 1 times weekly. Patient denies dysphagia, cough, hoarseness, chest pain, unintentional weight loss, N/V, bloating, early satiety, abdominal pain or melena, hematochezia, hematemesis, and coffee ground emesis. .      Insomnia:  Current treatment: avoiding heavy meal before bedtime, avoiding vigorous physical exercise prior to bed, going to sleep at the same time each night and prescription sleep aid- trazodone- 50 mg daily, which has been somewhat effective. Medication side effects: none. Sleep Symptoms:  Patient complains of a 2 month(s) history of early morning awakening, frequent night time awakening and non-restful sleep. Associated symptoms include: morning fatigue, daytime fatigue, falling asleep while driving, watching television, restless legs, snoring, morning headaches, depressed mood and will wake up choking. Patient denies: nocturia, leg cramps, restless legs, witnessed apneic episodes, cognitive changes, perimenopausal night sweats, anhedonia, feelings of hopelessness, irritability and excessive worry. Sleep hygiene problems: use of caffeine or other stimulants after noon, long or frequent naps during the day, heavy meals near bedtime and use of electronic devices near bedtime. Prior history of similar symptoms: no.  She has a history of  depression and anxiety disorder. Patient has not attempted self treatment. .      Mood Disorder:  Patient presents for follow-up of depression and anxiety disorder. Current complaints include: See PHQ9 below . She denies tearfulness, decreased libido, irritability, excessive worry, panic attacks, obsessive thoughts, compulsive behaviors, increased use of drugs or alcohol, suicidal thoughts or behavior, and impaired memory. Symptoms/signs of rambo: none. External stressors: nothing new.    Current treatment includes: Effexor- mg daily and sleep aid-trazodone 50 mg nightly as needed. Medication side effects: none. PHQ-9  3/15/2021 10/23/2020 2/17/2020 7/1/2019 3/8/2019 1/16/2019 8/7/2017   Little interest or pleasure in doing things 3 0 1 1 1 0 3   Feeling down, depressed, or hopeless 1 1 0 1 1 1 3   Trouble falling or staying asleep, or sleeping too much 3 1 2 3 2 - 3   Feeling tired or having little energy 3 1 1 2 1 - 3   Poor appetite or overeating 3 2 1 3 1 - 3   Feeling bad about yourself - or that you are a failure or have let yourself or your family down 1 0 0 1 1 - 1   Trouble concentrating on things, such as reading the newspaper or watching television 2 1 0 1 1 - 1   Moving or speaking so slowly that other people could have noticed. Or the opposite - being so fidgety or restless that you have been moving around a lot more than usual 0 0 0 1 1 - 1   Thoughts that you would be better off dead, or of hurting yourself in some way 0 0 0 0 0 - 0   PHQ-2 Score 4 1 1 2 2 1 6   PHQ-9 Total Score 16 6 5 13 9 1 18   If you checked off any problems, how difficult have these problems made it for you to do your work, take care of things at home, or get along with other people? 2 1 1 0 0 - 0     Interpretation of Total Score Total Score Depression Severity: 1-4 = Minimal depression, 5-9 = Mild depression, 10-14 = Moderate depression, 15-19 = Moderately severe depression, 20-27 = Severe depression      Review of Systems   Constitutional: Positive for appetite change, fatigue and unexpected weight change. HENT: Negative. Eyes: Negative. Respiratory: Positive for choking. Negative for shortness of breath. Cardiovascular: Negative. Negative for chest pain, palpitations and leg swelling. Gastrointestinal: Negative. Negative for abdominal pain, anal bleeding, blood in stool and nausea. Endocrine: Negative. Genitourinary: Negative. Negative for hematuria. Musculoskeletal: Negative. Skin: Negative. Negative for rash. Allergic/Immunologic: Negative. Neurological: Positive for headaches. Negative for dizziness, syncope, light-headedness and numbness. Hematological: Negative. Does not bruise/bleed easily. Psychiatric/Behavioral: Positive for decreased concentration, dysphoric mood and sleep disturbance. Negative for self-injury and suicidal ideas. The patient is nervous/anxious. All other systems reviewed and are negative.        Past Medical History:   Diagnosis Date    Back pain     Hypertension     Plantar fasciitis      Family History   Problem Relation Age of Onset    Diabetes Mother     High Blood Pressure Mother     High Blood Pressure Father     High Blood Pressure Sister      Past Surgical History:   Procedure Laterality Date     SECTION       Social History     Socioeconomic History    Marital status:      Spouse name: Not on file    Number of children: Not on file    Years of education: Not on file    Highest education level: Not on file   Occupational History    Not on file   Social Needs    Financial resource strain: Not on file    Food insecurity     Worry: Not on file     Inability: Not on file    Transportation needs     Medical: Not on file     Non-medical: Not on file   Tobacco Use    Smoking status: Current Every Day Smoker     Packs/day: 0.50     Years: 20.00     Pack years: 10.00     Types: Cigarettes    Smokeless tobacco: Never Used   Substance and Sexual Activity    Alcohol use: No    Drug use: No    Sexual activity: Yes     Partners: Male   Lifestyle    Physical activity     Days per week: Not on file     Minutes per session: Not on file    Stress: Not on file   Relationships    Social connections     Talks on phone: Not on file     Gets together: Not on file     Attends Restorationist service: Not on file     Active member of club or organization: Not on file     Attends meetings of clubs or organizations: Not on file     Relationship status: Not on file   [address] [place] [address] Intimate partner violence     Fear of current or ex partner: Not on file     Emotionally abused: Not on file     Physically abused: Not on file     Forced sexual activity: Not on file   Other Topics Concern    Not on file   Social History Narrative    Not on file     Current Outpatient Medications   Medication Sig Dispense Refill    venlafaxine (EFFEXOR XR) 150 MG extended release capsule TAKE ONE CAPSULE BY MOUTH DAILY 30 capsule 3    traZODone (DESYREL) 50 MG tablet Take 1 tablet by mouth nightly as needed for Sleep 30 tablet 1    lisinopril-hydroCHLOROthiazide (PRINZIDE;ZESTORETIC) 20-12.5 MG per tablet Take 1 tablet by mouth daily 90 tablet 1    omeprazole (PRILOSEC) 40 MG delayed release capsule TAKE ONE CAPSULE BY MOUTH DAILY 30 capsule 2     No current facility-administered medications for this visit. No changes in past medical history, past surgical history, social history, orfamily history were noted during the patient encounter unless specifically listed above. All updates of past medical history, past surgical history, social history, or family history were reviewed personally by me duringthe office visit. All problems listed in the assessment are stable unless noted otherwise. Medication profile reviewed personally by me during the office visit. Medication side effects and possible impairments frommedications were discussed as applicable. Objective:     /88 (Site: Right Upper Arm, Position: Sitting, Cuff Size: Large Adult)   Temp 97.1 °F (36.2 °C) (Temporal)   Ht 5' 3\" (1.6 m)   Wt 297 lb (134.7 kg)   LMP 04/10/2012 (Exact Date)   BMI 52.61 kg/m²   Body mass index is 52.61 kg/m². Wt Readings from Last 3 Encounters:   03/15/21 297 lb (134.7 kg)   02/17/20 266 lb (120.7 kg)   07/01/19 290 lb (131.5 kg)       Physical Exam  Vitals signs and nursing note reviewed. Constitutional:       General: She is not in acute distress. Appearance: Normal appearance.  She is 4. 4     Chloride 03/08/2021 103     CO2 03/08/2021 28     Anion Gap 03/08/2021 9     Glucose 03/08/2021 106*    BUN 03/08/2021 15     CREATININE 03/08/2021 0.6     GFR Non- 03/08/2021 >60     GFR  03/08/2021 >60     Calcium 03/08/2021 9.6     Total Protein 03/08/2021 7.4     Albumin 03/08/2021 4.3     Albumin/Globulin Ratio 03/08/2021 1.4     Total Bilirubin 03/08/2021 0.3     Alkaline Phosphatase 03/08/2021 72     ALT 03/08/2021 21     AST 03/08/2021 22     Globulin 03/08/2021 3.1     WBC 03/08/2021 7.8     RBC 03/08/2021 4.54     Hemoglobin 03/08/2021 13.5     Hematocrit 03/08/2021 40.4     MCV 03/08/2021 89.0     MCH 03/08/2021 29.6     MCHC 03/08/2021 33.3     RDW 03/08/2021 15.0     Platelets 35/56/0914 233     MPV 03/08/2021 10.1     Neutrophils % 03/08/2021 52.0     Lymphocytes % 03/08/2021 34.2     Monocytes % 03/08/2021 10.8     Eosinophils % 03/08/2021 2.4     Basophils % 03/08/2021 0.6     Neutrophils Absolute 03/08/2021 4.1     Lymphocytes Absolute 03/08/2021 2.7     Monocytes Absolute 03/08/2021 0.8     Eosinophils Absolute 03/08/2021 0.2     Basophils Absolute 03/08/2021 0.0     Cholesterol, Total 03/08/2021 157     Triglycerides 03/08/2021 157*    HDL 03/08/2021 43     LDL Calculated 03/08/2021 83     VLDL Cholesterol Calcula* 03/08/2021 31     Vit D, 25-Hydroxy 03/08/2021 28.5*    TSH 03/08/2021 1.88        No results found for this visit on 03/15/21. Assessment:       1. Moderate episode of recurrent major depressive disorder ([address]    2. Essential hypertension    3. Snoring    4. Daytime somnolence    5. Primary insomnia    6. Hyperglycemia    7. Gastroesophageal reflux disease without esophagitis    8. Vitamin D deficiency    9. Encounter for screening mammogram for malignant neoplasm of breast    10. Screening for colon cancer    11.  Positive depression screening        No results found for this visit on 03/15/21. Plan:       Tamiko Hernandez was seen today for anxiety, depression, hypertension and other. Diagnoses and all orders for this visit:    Moderate episode of recurrent major depressive disorder (HCC)--slight worsening  The patient is going to increase her trazodone from 50 mg to 100 mg for about the next 3 to 4 days and if she is still not sleeping well then she will increase it to 150 mg. She will call me next week to let me know if she is taking 100 mg or 150 mg  I educated her that I would like her to take the trazodone on a nightly basis to help with the results scheduled depression and anxiety 8 since she is not wanting to increase her Effexor at this time    Essential hypertension  Blood pressure is not quite at goal and she is having increased swelling in her bilateral lower extremities at the end of the day therefore I will increase the patient's lisinopril HCTZ from 20-12.5 mg to the following  -     lisinopril-hydroCHLOROthiazide (PRINZIDE;ZESTORETIC) 20-25 MG per tablet; Take 1 tablet by mouth daily  She is to monitor blood pressure on a daily basis for approximately the next 2 to 3 weeks and bring those in for review    Snoring--new problem  -     Suzanne Burgess MD, Sleep Medicine, [facility]  Patient referred to sleep specialist, however if her insurance will cover a home sleep study test she is more interested in doing that and will call the office and let me know    Daytime somnolence--new problem  -     Suzanne Burgess MD, Sleep Medicine, [facility]    Primary insomnia  Discussed sleep hygiene measures including regular sleep schedule, optimal sleep environment, and relaxing presleep rituals. Avoid daytime naps. Avoid caffeine after noon. Avoid excess alcohol. Avoid tobacco.  Recommended daily exercise.   Also increase trazodone as noted above    Hyperglycemia--new problem  -     POCT glycosylated hemoglobin (Hb A1C)  Your 3 month average blood sugar index is within the normal range- no evidence of diabetes. The patient is asked to make an attempt to improve diet and exercise patterns to aid in medical management of this problem. Gastroesophageal reflux disease without esophagitis  Patient is only using PPI as needed a couple of times per month and is well controlled    Vitamin D deficiency  -     vitamin D (D3-50) 9[address]60 UNIT CAPS; TAKE ONE CAPSULE BY MOUTH ONCE WEEKLY  Discussed with patient that we make vitamin D from the sun and get it from some food sources, but it is very common to be deficient. Discussed the need for vitamin D replacement because low vitamin D can cause fatigue, joint aches and has been implicated in heart disease, bone disease like osteoporosis, and some other chronic illnesses. Patient will start/continue vitamin D supplement as per order. Recommend vitamin D to be rechecked in 6 months. Encounter for screening mammogram for malignant neoplasm of breast  -     VIOLETA DIGITAL SCREEN W OR WO CAD BILATERAL; Future    Screening for colon cancer  -     POCT Fecal Immunochemical Test (FIT); Future  -     POCT Fecal Immunochemical Test (FIT)    Positive depression screening  -     Positive Screen for Clinical Depression with a Documented Follow-up Plan     On the basis of positive PHQ-9 screening (PHQ-9 Total Score: 16), the following plan was implemented: medication prescribed - patient will call for any significant medication side effects or worsening symptoms of depression. Patient will follow-up in 6 week(s) with PCP. Patient has been instructed call the office immediately with new symptoms, change in symptoms or worseningof symptoms. If this is not feasible, patient is instructed to report to the emergency room. Medication profile reviewed. Medication side effects and possible impairments from medications were discussed as applicable. Allergies were reviewed. Health maintenance was reviewed and updated as appropriate.

## 2021-05-20 DIAGNOSIS — F41.8 DEPRESSION WITH ANXIETY: ICD-10-CM

## 2021-05-20 DIAGNOSIS — F33.0 MILD EPISODE OF RECURRENT MAJOR DEPRESSIVE DISORDER (HCC): ICD-10-CM

## 2021-05-20 RX ORDER — VENLAFAXINE HYDROCHLORIDE 150 MG/1
CAPSULE, EXTENDED RELEASE ORAL
Qty: 30 CAPSULE | Refills: 3 | Status: SHIPPED | OUTPATIENT
Start: 2021-05-20 | End: 2021-05-24

## 2021-05-22 DIAGNOSIS — F41.8 DEPRESSION WITH ANXIETY: ICD-10-CM

## 2021-05-22 DIAGNOSIS — F33.0 MILD EPISODE OF RECURRENT MAJOR DEPRESSIVE DISORDER (HCC): ICD-10-CM

## 2021-05-24 RX ORDER — VENLAFAXINE HYDROCHLORIDE 150 MG/1
CAPSULE, EXTENDED RELEASE ORAL
Qty: 30 CAPSULE | Refills: 3 | Status: SHIPPED | OUTPATIENT
Start: 2021-05-24 | End: 2022-02-09

## 2021-05-27 ENCOUNTER — HOSPITAL ENCOUNTER (OUTPATIENT)
Dept: MAMMOGRAPHY | Age: 56
Discharge: HOME | End: 2021-06-01
Payer: COMMERCIAL

## 2021-05-27 DIAGNOSIS — Z12.31 VISIT FOR SCREENING MAMMOGRAM: ICD-10-CM

## 2021-05-27 DIAGNOSIS — Z12.31 ENCOUNTER FOR SCREENING MAMMOGRAM FOR MALIGNANT NEOPLASM OF BREAST: ICD-10-CM

## 2021-06-04 ENCOUNTER — TELEPHONE (OUTPATIENT)
Dept: FAMILY MEDICINE CLINIC | Age: 56
End: 2021-06-04

## 2021-06-04 NOTE — TELEPHONE ENCOUNTER
[facility] for pt to CB and schedule with mammogram Kaycee Petersen. (pt past due for f/u w/PCP for [facility])

## 2021-07-19 DIAGNOSIS — I10 ESSENTIAL HYPERTENSION: ICD-10-CM

## 2021-07-19 RX ORDER — LISINOPRIL AND HYDROCHLOROTHIAZIDE 25; 20 MG/1; MG/1
TABLET ORAL
Qty: 30 TABLET | Refills: 2 | Status: SHIPPED | OUTPATIENT
Start: 2021-07-19 | End: 2021-10-18

## 2021-10-17 DIAGNOSIS — I10 ESSENTIAL HYPERTENSION: ICD-10-CM

## 2021-10-18 RX ORDER — LISINOPRIL AND HYDROCHLOROTHIAZIDE 25; 20 MG/1; MG/1
TABLET ORAL
Qty: 30 TABLET | Refills: 2 | Status: SHIPPED | OUTPATIENT
Start: 2021-10-18 | End: 2022-02-09

## 2021-10-18 NOTE — TELEPHONE ENCOUNTER
Last visit 3/15/21  No future appointments.
Learning behavioral activities to cope with urges.  For example, distraction and changing routines

## 2021-11-02 DIAGNOSIS — L60.0 INGROWN TOENAIL: Primary | ICD-10-CM

## 2022-03-11 DIAGNOSIS — F41.8 DEPRESSION WITH ANXIETY: ICD-10-CM

## 2022-03-11 DIAGNOSIS — I10 ESSENTIAL HYPERTENSION: ICD-10-CM

## 2022-03-11 DIAGNOSIS — F33.0 MILD EPISODE OF RECURRENT MAJOR DEPRESSIVE DISORDER (HCC): ICD-10-CM

## 2022-03-11 RX ORDER — LISINOPRIL AND HYDROCHLOROTHIAZIDE 25; 20 MG/1; MG/1
TABLET ORAL
Qty: 30 TABLET | Refills: 0 | Status: SHIPPED | OUTPATIENT
Start: 2022-03-11 | End: 2022-03-17 | Stop reason: SDUPTHER

## 2022-03-11 RX ORDER — VENLAFAXINE HYDROCHLORIDE 150 MG/1
CAPSULE, EXTENDED RELEASE ORAL
Qty: 30 CAPSULE | Refills: 0 | Status: SHIPPED | OUTPATIENT
Start: 2022-03-11 | End: 2022-03-17 | Stop reason: SDUPTHER

## 2022-03-11 NOTE — TELEPHONE ENCOUNTER
Katelynn THOMAS [facility] Support Staff  Subject: Refill Request     QUESTIONS   Name of Medication? lisinopril-hydroCHLOROthiazide (PRINZIDE;ZESTORETIC)   20-25 MG per tablet   Patient-reported dosage and instructions? 20-25 mg   How many days do you have left? 3   Preferred Pharmacy? [id number]   Pharmacy phone number (if available)? [phone]   Additional Information for Provider? Patient will run out prior to being   seen would like some or all called in if possible.   ---------------------------------------------------------------------------   --------------,   Name of Medication? venlafaxine (EFFEXOR XR) 150 MG extended release   capsule   Patient-reported dosage and instructions? 150 mg   How many days do you have left? 3   Preferred Pharmacy? [facility]   Pharmacy phone number (if available)? [phone]   ---------------------------------------------------------------------------   --------------   CALL BACK INFO   What is the best way for the office to contact you?  OK to leave message on   voicemail   Preferred Call Back Phone Number? [phone]

## 2022-03-17 ENCOUNTER — OFFICE VISIT (OUTPATIENT)
Dept: FAMILY MEDICINE CLINIC | Age: 57
End: 2022-03-17
Payer: COMMERCIAL

## 2022-03-17 VITALS
BODY MASS INDEX: 49.43 KG/M2 | OXYGEN SATURATION: 97 % | HEIGHT: 63 IN | WEIGHT: 279 LBS | HEART RATE: 86 BPM | DIASTOLIC BLOOD PRESSURE: 74 MMHG | SYSTOLIC BLOOD PRESSURE: 108 MMHG

## 2022-03-17 DIAGNOSIS — E55.9 VITAMIN D DEFICIENCY: ICD-10-CM

## 2022-03-17 DIAGNOSIS — N39.0 URINARY TRACT INFECTION WITHOUT HEMATURIA, SITE UNSPECIFIED: ICD-10-CM

## 2022-03-17 DIAGNOSIS — Z12.31 SCREENING MAMMOGRAM FOR BREAST CANCER: ICD-10-CM

## 2022-03-17 DIAGNOSIS — F51.01 PRIMARY INSOMNIA: ICD-10-CM

## 2022-03-17 DIAGNOSIS — Z13.220 SCREENING FOR CHOLESTEROL LEVEL: ICD-10-CM

## 2022-03-17 DIAGNOSIS — R30.0 DYSURIA: ICD-10-CM

## 2022-03-17 DIAGNOSIS — Z13.31 POSITIVE DEPRESSION SCREENING: ICD-10-CM

## 2022-03-17 DIAGNOSIS — F41.9 ANXIETY: ICD-10-CM

## 2022-03-17 DIAGNOSIS — I10 ESSENTIAL HYPERTENSION: ICD-10-CM

## 2022-03-17 DIAGNOSIS — F33.0 MILD EPISODE OF RECURRENT MAJOR DEPRESSIVE DISORDER (HCC): Primary | ICD-10-CM

## 2022-03-17 LAB
BILIRUBIN, POC: NEGATIVE
BLOOD URINE, POC: NORMAL
CLARITY, POC: NORMAL
COLOR, POC: NORMAL
GLUCOSE URINE, POC: NEGATIVE
KETONES, POC: NEGATIVE
LEUKOCYTE EST, POC: NORMAL
NITRITE, POC: POSITIVE
PH, POC: 6
PROTEIN, POC: NEGATIVE
SPECIFIC GRAVITY, POC: 1.02
UROBILINOGEN, POC: 0.2

## 2022-03-17 PROCEDURE — 81002 URINALYSIS NONAUTO W/O SCOPE: CPT | Performed by: NURSE PRACTITIONER

## 2022-03-17 PROCEDURE — 99214 OFFICE O/P EST MOD 30 MIN: CPT | Performed by: NURSE PRACTITIONER

## 2022-03-17 RX ORDER — TRAZODONE HYDROCHLORIDE 50 MG/1
50 TABLET ORAL NIGHTLY PRN
Qty: 30 TABLET | Refills: 5 | Status: SHIPPED | OUTPATIENT
Start: 2022-03-17 | End: 2022-10-14 | Stop reason: ALTCHOICE

## 2022-03-17 RX ORDER — LISINOPRIL AND HYDROCHLOROTHIAZIDE 25; 20 MG/1; MG/1
TABLET ORAL
Qty: 30 TABLET | Refills: 5 | Status: SHIPPED | OUTPATIENT
Start: 2022-03-17 | End: 2022-10-05

## 2022-03-17 RX ORDER — SULFAMETHOXAZOLE AND TRIMETHOPRIM 800; 160 MG/1; MG/1
1 TABLET ORAL 2 TIMES DAILY
Qty: 14 TABLET | Refills: 0 | Status: SHIPPED | OUTPATIENT
Start: 2022-03-17 | End: 2022-03-24

## 2022-03-17 RX ORDER — PHENAZOPYRIDINE HYDROCHLORIDE 200 MG/1
200 TABLET, FILM COATED ORAL 3 TIMES DAILY PRN
Qty: 15 TABLET | Refills: 0 | Status: SHIPPED | OUTPATIENT
Start: 2022-03-17 | End: 2022-03-20

## 2022-03-17 RX ORDER — CHOLECALCIFEROL (VITAMIN D3) 1250 MCG
CAPSULE ORAL
Qty: 4 CAPSULE | Refills: 5 | Status: SHIPPED | OUTPATIENT
Start: 2022-03-17

## 2022-03-17 RX ORDER — VENLAFAXINE HYDROCHLORIDE 150 MG/1
CAPSULE, EXTENDED RELEASE ORAL
Qty: 30 CAPSULE | Refills: 5 | Status: SHIPPED | OUTPATIENT
Start: 2022-03-17 | End: 2022-10-05

## 2022-03-17 SDOH — ECONOMIC STABILITY: FOOD INSECURITY: WITHIN THE PAST 12 MONTHS, YOU WORRIED THAT YOUR FOOD WOULD RUN OUT BEFORE YOU GOT MONEY TO BUY MORE.: NEVER TRUE

## 2022-03-17 SDOH — ECONOMIC STABILITY: FOOD INSECURITY: WITHIN THE PAST 12 MONTHS, THE FOOD YOU BOUGHT JUST DIDN'T LAST AND YOU DIDN'T HAVE MONEY TO GET MORE.: NEVER TRUE

## 2022-03-17 ASSESSMENT — PATIENT HEALTH QUESTIONNAIRE - PHQ9
8. MOVING OR SPEAKING SO SLOWLY THAT OTHER PEOPLE COULD HAVE NOTICED. OR THE OPPOSITE, BEING SO FIGETY OR RESTLESS THAT YOU HAVE BEEN MOVING AROUND A LOT MORE THAN USUAL: 0
SUM OF ALL RESPONSES TO PHQ9 QUESTIONS 1 & 2: 2
2. FEELING DOWN, DEPRESSED OR HOPELESS: 1
4. FEELING TIRED OR HAVING LITTLE ENERGY: 2
6. FEELING BAD ABOUT YOURSELF - OR THAT YOU ARE A FAILURE OR HAVE LET YOURSELF OR YOUR FAMILY DOWN: 1
9. THOUGHTS THAT YOU WOULD BE BETTER OFF DEAD, OR OF HURTING YOURSELF: 0
SUM OF ALL RESPONSES TO PHQ QUESTIONS 1-9: 10
5. POOR APPETITE OR OVEREATING: 2
SUM OF ALL RESPONSES TO PHQ QUESTIONS 1-9: 10
SUM OF ALL RESPONSES TO PHQ QUESTIONS 1-9: 10
1. LITTLE INTEREST OR PLEASURE IN DOING THINGS: 1
SUM OF ALL RESPONSES TO PHQ QUESTIONS 1-9: 10
7. TROUBLE CONCENTRATING ON THINGS, SUCH AS READING THE NEWSPAPER OR WATCHING TELEVISION: 1
10. IF YOU CHECKED OFF ANY PROBLEMS, HOW DIFFICULT HAVE THESE PROBLEMS MADE IT FOR YOU TO DO YOUR WORK, TAKE CARE OF THINGS AT HOME, OR GET ALONG WITH OTHER PEOPLE: 1
3. TROUBLE FALLING OR STAYING ASLEEP: 2

## 2022-03-17 ASSESSMENT — SOCIAL DETERMINANTS OF HEALTH (SDOH): HOW HARD IS IT FOR YOU TO PAY FOR THE VERY BASICS LIKE FOOD, HOUSING, MEDICAL CARE, AND HEATING?: NOT HARD AT ALL

## 2022-03-17 ASSESSMENT — ENCOUNTER SYMPTOMS
NAUSEA: 0
VOMITING: 0

## 2022-03-17 NOTE — PROGRESS NOTES
[address] [facility]  FAMILY MEDICINE  [address] [place] [address]  Dept: [phone]  Dept Fax: [phone]  Loc: [phone]    Joan Torres is a 64 y.o. female who presents today for her medical conditions/complaints as noted below. Joan Torres is c/o of Hypertension (BP has been running 120's over 80's. Some swelling on bi-lateral feet off and on. Denies SOB or chest pains), Anxiety, Depression, Other (Vitamin D deficiency), and Other (possible UTI-pain with urination)       Subjective:     Chief Complaint   Patient presents with    Hypertension     BP has been running 120's over 80's. Some swelling on bi-lateral feet off and on. Denies SOB or chest pains    Anxiety    Depression    Other     Vitamin D deficiency    Other     possible UTI-pain with urination       Urinary Tract Infection   This is a new problem. The current episode started 1 to 4 weeks ago (1.5 weeks). The problem has been unchanged. The quality of the pain is described as burning. There has been no fever. There is no history of pyelonephritis. Associated symptoms include frequency, hesitancy and urgency. Pertinent negatives include no chills, discharge, flank pain, hematuria, nausea, possible pregnancy or vomiting. She has tried increased fluids for the symptoms. The treatment provided no relief. There is no history of catheterization, kidney stones, recurrent UTIs, a single kidney, urinary stasis or a urological procedure. Hypertension:    The patient is here for routine follow up on HTN. Patient denies chest pain, shortness of breath, headache, lightheadedness, blurred vision, palpitations, dry cough, and fatigue. She is adherent to a low sodium diet.    Patient denies antihypertensive medication side effects of: fatigue, dry cough, swelling in ankles, weakness, orthostatic lightheadedness, myalgias, rash, nausea, abdominal discomfort, headaches, insomnia, weight gain, palpitations, slow heart rate, excessive urination, depression, and wheezing. Blood pressure typically runs 120/80 outside of the office. She is not checking her BP outside of the office. Swelling in BLE at end of the day. No change    Sodium (mmol/L)   Date Value   03/08/2021 140    BUN (mg/dL)   Date Value   03/08/2021 15    Glucose (mg/dL)   Date Value   03/08/2021 106 (H)      Potassium (mmol/L)   Date Value   03/08/2021 4.4    CREATININE (mg/dL)   Date Value   03/08/2021 0.6         BP Readings from Last 3 Encounters:   03/17/22 108/74   03/15/21 122/88   02/17/20 104/74       Vitamin D Deficiency  Patient is here to follow up on vitamin D deficiency. Patient is taking vitamin D supplement without any adverse effects as prescribed. Lab Results   Component Value Date    VITD25 28.5 (L) 03/08/2021     Insomnia:  Current treatment: avoiding heavy meal before bedtime, avoiding vigorous physical exercise prior to bed, going to sleep at the same time each night and prescription sleep aid- trazodone- 50 mg daily, which has been somewhat effective. Medication side effects: none. Past Medical History:   Diagnosis Date    Back pain     Hypertension     Plantar fasciitis      GERD  Carlos is here for follow up of heartburn. Symptoms have been present for awhile and is a chronic condition. Currently treated with lifestyle change including dietary changes  which has been effective. She has been using this therapy Approximately 1 times weekly. Patient denies dysphagia, cough, hoarseness, chest pain, unintentional weight loss, N/V, bloating, early satiety, abdominal pain or melena, hematochezia, hematemesis, and coffee ground emesis. .        Mood Disorder:  Patient presents for follow-up of depression and anxiety disorder. Current complaints include: See PHQ9 below .     She denies tearfulness, decreased libido, irritability, excessive worry, panic attacks, obsessive thoughts, compulsive behaviors, increased use of drugs or alcohol, suicidal thoughts or behavior, and impaired memory. Symptoms/signs of rambo: none. External stressors: stress at work. Current treatment includes: Effexor- xr 150 mg daily. Medication side effects: none. PHQ-9  3/17/2022 3/15/2021 10/23/2020 2/17/2020 7/1/2019 3/8/2019 1/16/2019   Little interest or pleasure in doing things 1 3 0 1 1 1 0   Feeling down, depressed, or hopeless 1 1 1 0 1 1 1   Trouble falling or staying asleep, or sleeping too much 2 3 1 2 3 2 -   Feeling tired or having little energy 2 3 1 1 2 1 -   Poor appetite or overeating 2 3 2 1 3 1 -   Feeling bad about yourself - or that you are a failure or have let yourself or your family down 1 1 0 0 1 1 -   Trouble concentrating on things, such as reading the newspaper or watching television 1 2 1 0 1 1 -   Moving or speaking so slowly that other people could have noticed. Or the opposite - being so fidgety or restless that you have been moving around a lot more than usual 0 0 0 0 1 1 -   Thoughts that you would be better off dead, or of hurting yourself in some way 0 0 0 0 0 0 -   PHQ-2 Score 2 4 1 1 2 2 1   PHQ-9 Total Score 10 16 6 5 13 9 1   If you checked off any problems, how difficult have these problems made it for you to do your work, take care of things at home, or get along with other people? 1 2 1 1 0 0 -     Interpretation of Total Score Total Score Depression Severity: 1-4 = Minimal depression, 5-9 = Mild depression, 10-14 = Moderate depression, 15-19 = Moderately severe depression, 20-27 = Severe depression    Review of Systems   Constitutional: Positive for appetite change and fatigue. Negative for chills and unexpected weight change. HENT: Negative. Eyes: Negative. Respiratory: Negative. Negative for shortness of breath. Cardiovascular: Negative. Negative for chest pain, palpitations and leg swelling. Gastrointestinal: Negative.   Negative for abdominal pain, anal bleeding, blood in stool, nausea and vomiting. Endocrine: Negative. Genitourinary: Positive for dysuria, frequency, hesitancy and urgency. Negative for flank pain and hematuria. Musculoskeletal: Negative. Skin: Negative. Negative for rash. Allergic/Immunologic: Negative. Neurological: Negative. Negative for dizziness, syncope, light-headedness and numbness. Hematological: Negative. Does not bruise/bleed easily. Psychiatric/Behavioral: Positive for dysphoric mood and sleep disturbance. The patient is nervous/anxious. All other systems reviewed and are negative.        Past Medical History:   Diagnosis Date    Back pain     Hypertension     Plantar fasciitis      Family History   Problem Relation Age of Onset    Diabetes Mother     High Blood Pressure Mother     High Blood Pressure Father     High Blood Pressure Sister      Past Surgical History:   Procedure Laterality Date     SECTION      OTHER SURGICAL HISTORY  10/18/2021    Matrixectomy     Social History     Socioeconomic History    Marital status:      Spouse name: Not on file    Number of children: Not on file    Years of education: Not on file    Highest education level: Not on file   Occupational History    Not on file   Tobacco Use    Smoking status: Current Every Day Smoker     Packs/day: 0.50     Years: 20.00     Pack years: 10.00     Types: Cigarettes    Smokeless tobacco: Never Used   Substance and Sexual Activity    Alcohol use: No    Drug use: No    Sexual activity: Yes     Partners: Male   Other Topics Concern    Not on file   Social History Narrative    Not on file     Social Determinants of Health     Financial Resource Strain: Low Risk     Difficulty of Paying Living Expenses: Not hard at all   Food Insecurity: No Food Insecurity    Worried About Running Out of Food in the Last Year: Never true    [place] of Food in the Last Year: Never true   Transportation Needs:  Lack of Transportation (Medical): Not on file    Lack of Transportation (Non-Medical): Not on file   Physical Activity:     Days of Exercise per Week: Not on file    Minutes of Exercise per Session: Not on file   Stress:     Feeling of Stress : Not on file   Social Connections:     Frequency of Communication with Friends and Family: Not on file    Frequency of Social Gatherings with Friends and Family: Not on file    Attends [employer] Services: Not on file    Active Member of [address] [place] [address] or Organizations: Not on file    Attends Club or Organization Meetings: Not on file    Marital Status: Not on file   Intimate Partner Violence:     Fear of Current or Ex-Partner: Not on file    Emotionally Abused: Not on file    Physically Abused: Not on file    Sexually Abused: Not on file   Housing Stability:     Unable to Pay for Housing in the Last Year: Not on file    Number of [place] in the Last Year: Not on file    Unstable Housing in the Last Year: Not on file     Current Outpatient Medications   Medication Sig Dispense Refill    lisinopril-hydroCHLOROthiazide (PRINZIDE;ZESTORETIC) 20-25 MG per tablet TAKE ONE TABLET BY MOUTH DAILY 30 tablet 0    venlafaxine (EFFEXOR XR) 150 MG extended release capsule TAKE ONE CAPSULE BY MOUTH DAILY 30 capsule 0    Cholecalciferol (VITAMIN D3) 1.25 MG (03548 UT) CAPS TAKE 1 CAPSULE BY MOUTH ONCE WEEKLY 4 capsule 5    traZODone (DESYREL) 50 MG tablet Take 1 tablet by mouth nightly as needed for Sleep 30 tablet 1    omeprazole (PRILOSEC) 40 MG delayed release capsule TAKE ONE CAPSULE BY MOUTH DAILY (Patient not taking: Reported on 3/17/2022) 30 capsule 2     No current facility-administered medications for this visit. No changes in past medical history, past surgical history, social history, orfamily history were noted during the patient encounter unless specifically listed above.   All updates of past medical history, past surgical history, social history, or family history were reviewed personally by me duringthe office visit. All problems listed in the assessment are stable unless noted otherwise. Medication profile reviewed personally by me during the office visit. Medication side effects and possible impairments frommedications were discussed as applicable. Objective:     Physical Exam  Vitals and nursing note reviewed. Constitutional:       General: She is not in acute distress. Appearance: Normal appearance. She is well-developed. She is obese. HENT:      Head: Normocephalic and atraumatic. Right Ear: Tympanic membrane, ear canal and external ear normal.      Left Ear: Tympanic membrane, ear canal and external ear normal.      Nose: Nose normal.      Mouth/Throat:      Pharynx: Uvula midline. No oropharyngeal exudate. Eyes:      General: Lids are normal.      Conjunctiva/sclera: Conjunctivae normal.      Pupils: Pupils are equal, round, and reactive to light. Neck:      Thyroid: No thyromegaly. Vascular: No carotid bruit or JVD. Cardiovascular:      Rate and Rhythm: Normal rate and regular rhythm. Pulses: Normal pulses. Radial pulses are 2+ on the right side and 2+ on the left side. Dorsalis pedis pulses are 2+ on the right side and 2+ on the left side. Posterior tibial pulses are 2+ on the right side and 2+ on the left side. Heart sounds: Normal heart sounds. No murmur heard. No friction rub. No gallop. Pulmonary:      Effort: Pulmonary effort is normal.      Breath sounds: Normal breath sounds. Abdominal:      General: Bowel sounds are normal.      Palpations: Abdomen is soft. There is no mass. Tenderness: There is abdominal tenderness in the suprapubic area. There is no right CVA tenderness, left CVA tenderness, guarding or rebound. Musculoskeletal:         General: Normal range of motion. Cervical back: Normal range of motion and neck supple.    Lymphadenopathy:      Head: Right side of head: No submandibular adenopathy. Left side of head: No submandibular adenopathy. Cervical: No cervical adenopathy. Skin:     General: Skin is warm and dry. Findings: No lesion or rash. Neurological:      Mental Status: She is alert and oriented to person, place, and time. Gait: Gait normal.   Psychiatric:         Speech: Speech normal.         Behavior: Behavior normal.         Thought Content: Thought content normal.         Judgment: Judgment normal.         /74 (Site: Left Upper Arm, Position: Sitting, Cuff Size: Large Adult)   Pulse 86   Ht 5' 3\" (1.6 m)   Wt 279 lb (126.6 kg)   LMP 04/10/2012 (Exact Date)   SpO2 97%   BMI 49.42 kg/m²   Body mass index is 49.42 kg/m². BP Readings from Last 2 Encounters:   03/17/22 108/74   03/15/21 122/88       Wt Readings from Last 3 Encounters:   03/17/22 279 lb (126.6 kg)   03/15/21 297 lb (134.7 kg)   02/17/20 266 lb (120.7 kg)       Lab Review   No visits with results within 2 Month(s) from this visit. Latest known visit with results is:   Office Visit on 03/15/2021   Component Date Value    OCCULT BLOOD FECAL 03/15/2021 negative     Hemoglobin A1C 03/15/2021 4.7        Results for orders placed or performed in visit on 03/17/22   POCT Urinalysis no Micro   Result Value Ref Range    Color, UA      Clarity, UA      Glucose, UA POC negative     Bilirubin, UA negative     Ketones, UA negative     Spec Grav, UA 1.020     Blood, UA POC trace-intact     pH, UA 6.0     Protein, UA POC negative     Urobilinogen, UA 0.2     Leukocytes, UA moderate     Nitrite, UA positive           Assessment:       1. Mild episode of recurrent major depressive disorder ([facility] [address]    2. Essential hypertension    3. Urinary tract infection without hematuria, site unspecified    4. Dysuria    5. Anxiety    6. Primary insomnia    7. Vitamin D deficiency    8. Screening for cholesterol level    9. Screening mammogram for breast cancer    10.  Positive depression screening        Results for POC orders placed in visit on 03/17/22   POCT Urinalysis no Micro   Result Value Ref Range    Color, UA      Clarity, UA      Glucose, UA POC negative     Bilirubin, UA negative     Ketones, UA negative     Spec Grav, UA 1.020     Blood, UA POC trace-intact     pH, UA 6.0     Protein, UA POC negative     Urobilinogen, UA 0.2     Leukocytes, UA moderate     Nitrite, UA positive             Plan:       Mg Black was seen today for hypertension, anxiety, depression, other and other. Diagnoses and all orders for this visit:    Mild episode of recurrent major depressive disorder ([facility] [address]  -     venlafaxine (EFFEXOR XR) 150 MG extended release capsule; TAKE ONE CAPSULE BY MOUTH DAILY  Educated if patient develops SI/HI/rambo to call 911 or go to ER. Discussed use, benefit, risks and side effects of prescribed medications. Barriers to compliance discussed. All patient questions answered. Pt voiced understanding. Essential hypertension  -     Comprehensive Metabolic Panel; Future  -     CBC with Auto Differential; Future  -     lisinopril-hydroCHLOROthiazide (PRINZIDE;ZESTORETIC) 20-25 MG per tablet; TAKE ONE TABLET BY MOUTH DAILY  BP a little low today  You should monitor your blood pressure closely at home, then call or send a [employer] message in 1-2 weeks to report the results. Urinary tract infection without hematuria, site unspecified  Dysuria  -     POCT Urinalysis no Micro  Will treat empirically for cystitis based upon UA results, pending results of culture. -     sulfamethoxazole-trimethoprim (BACTRIM DS) 800-160 MG per tablet; Take 1 tablet by mouth 2 times daily for 7 days  -     Culture, Urine  -     phenazopyridine (PYRIDIUM) 200 MG tablet; Take 1 tablet by mouth 3 times daily as needed for Pain    Anxiety  Start   -     traZODone (DESYREL) 50 MG tablet;  Take 1 tablet by mouth nightly as needed for Sleep    Primary insomnia  Start   -     traZODone (DESYREL) 50 MG tablet; Take 1 tablet by mouth nightly as needed for Sleep  Patient counselled on proper usage of medication, and was advised of potential side effects and risks associated with this medication. The patient expressed understanding of above and agreed to proceed with treatment.  -     TSH with Reflex; Future    Vitamin D deficiency  Discussed with patient that we make vitamin D from the sun and get it from some food sources, but it is very common to be deficient. Discussed the need for vitamin D replacement because low vitamin D can cause fatigue, joint aches and has been implicated in heart disease, bone disease like osteoporosis, and some other chronic illnesses. Patient will start/continue vitamin D supplement as per order. Recommend vitamin D to be rechecked in 6 months. -     Cholecalciferol (VITAMIN D3) 1.25 MG (21700 UT) CAPS; TAKE 1 CAPSULE BY MOUTH ONCE WEEKLY  -     Vitamin D 25 Hydroxy; Future    Screening for cholesterol level  -     Lipid Panel; Future      Screening mammogram for breast cancer  -     VIOLETA DIGITAL SCREEN W OR WO CAD BILATERAL; Future    Positive depression screening  PHQ-9 score today: (PHQ-9 Total Score: 10), additional evaluation and assessment performed, follow-up plan includes but not limited to: Medication management and Referral to /Specialist  for evaluation and management      Patient has been instructed call the office immediately with new symptoms, change in symptoms or worseningof symptoms. If this is not feasible, patient is instructed to report to the emergency room. Medication profile reviewed. Medication side effects and possible impairments from medications were discussed as applicable. Allergies were reviewed. Health maintenance was reviewed and updated as appropriate. Return in about 6 months (around 9/17/2022) for [facility] also needs fasting labs in next few weeks.   6 week f/u on adding trazodone      (Comment: Please note this report has been produced using a combination of typing and speech recognition software and may contain errors related to that system including errors in grammar, punctuation, and spelling, as well as words and phrases that may be inappropriate.  If there are any questions or concerns please feel free to contact the dictating provider for clarification.)

## 2022-03-18 PROBLEM — K21.9 GASTROESOPHAGEAL REFLUX DISEASE WITHOUT ESOPHAGITIS: Status: RESOLVED | Noted: 2019-01-16 | Resolved: 2022-03-18

## 2022-03-18 PROBLEM — F51.01 PRIMARY INSOMNIA: Status: ACTIVE | Noted: 2022-03-18

## 2022-03-18 PROBLEM — L60.0 INGROWN TOENAIL: Status: RESOLVED | Noted: 2021-11-02 | Resolved: 2022-03-18

## 2022-03-18 PROBLEM — F33.0 MILD EPISODE OF RECURRENT MAJOR DEPRESSIVE DISORDER (HCC): Status: ACTIVE | Noted: 2022-03-18

## 2022-03-18 ASSESSMENT — ENCOUNTER SYMPTOMS
BLOOD IN STOOL: 0
SHORTNESS OF BREATH: 0
ALLERGIC/IMMUNOLOGIC NEGATIVE: 1
GASTROINTESTINAL NEGATIVE: 1
EYES NEGATIVE: 1
RESPIRATORY NEGATIVE: 1
ABDOMINAL PAIN: 0
ANAL BLEEDING: 0

## 2022-03-21 ENCOUNTER — NURSE ONLY (OUTPATIENT)
Dept: FAMILY MEDICINE CLINIC | Age: 57
End: 2022-03-21
Payer: COMMERCIAL

## 2022-03-21 DIAGNOSIS — I10 ESSENTIAL HYPERTENSION: ICD-10-CM

## 2022-03-21 DIAGNOSIS — Z13.220 SCREENING FOR CHOLESTEROL LEVEL: ICD-10-CM

## 2022-03-21 DIAGNOSIS — E55.9 VITAMIN D DEFICIENCY: ICD-10-CM

## 2022-03-21 LAB
A/G RATIO: 1.4 (ref 1.1–2.2)
ALBUMIN SERPL-MCNC: 4 G/DL (ref 3.4–5)
ALP BLD-CCNC: 72 U/L (ref 40–129)
ALT SERPL-CCNC: 14 U/L (ref 10–40)
ANION GAP SERPL CALCULATED.3IONS-SCNC: 15 MMOL/L (ref 3–16)
AST SERPL-CCNC: 14 U/L (ref 15–37)
BASOPHILS ABSOLUTE: 0.1 K/UL (ref 0–0.2)
BASOPHILS RELATIVE PERCENT: 0.9 %
BILIRUB SERPL-MCNC: 0.3 MG/DL (ref 0–1)
BUN BLDV-MCNC: 18 MG/DL (ref 7–20)
CALCIUM SERPL-MCNC: 9.5 MG/DL (ref 8.3–10.6)
CHLORIDE BLD-SCNC: 102 MMOL/L (ref 99–110)
CHOLESTEROL, TOTAL: 177 MG/DL (ref 0–199)
CO2: 23 MMOL/L (ref 21–32)
CREAT SERPL-MCNC: 0.6 MG/DL (ref 0.6–1.1)
EOSINOPHILS ABSOLUTE: 0.2 K/UL (ref 0–0.6)
EOSINOPHILS RELATIVE PERCENT: 2.4 %
GFR AFRICAN AMERICAN: >60
GFR NON-AFRICAN AMERICAN: >60
GLUCOSE BLD-MCNC: 102 MG/DL (ref 70–99)
HCT VFR BLD CALC: 39.8 % (ref 36–48)
HDLC SERPL-MCNC: 49 MG/DL (ref 40–60)
HEMOGLOBIN: 13.1 G/DL (ref 12–16)
LDL CHOLESTEROL CALCULATED: 90 MG/DL
LYMPHOCYTES ABSOLUTE: 2.9 K/UL (ref 1–5.1)
LYMPHOCYTES RELATIVE PERCENT: 43.9 %
MCH RBC QN AUTO: 29.4 PG (ref 26–34)
MCHC RBC AUTO-ENTMCNC: 33 G/DL (ref 31–36)
MCV RBC AUTO: 89.3 FL (ref 80–100)
MONOCYTES ABSOLUTE: 0.7 K/UL (ref 0–1.3)
MONOCYTES RELATIVE PERCENT: 10.1 %
NEUTROPHILS ABSOLUTE: 2.8 K/UL (ref 1.7–7.7)
NEUTROPHILS RELATIVE PERCENT: 42.7 %
PDW BLD-RTO: 14.9 % (ref 12.4–15.4)
PLATELET # BLD: 218 K/UL (ref 135–450)
PMV BLD AUTO: 9.8 FL (ref 5–10.5)
POTASSIUM SERPL-SCNC: 4.2 MMOL/L (ref 3.5–5.1)
RBC # BLD: 4.46 M/UL (ref 4–5.2)
SODIUM BLD-SCNC: 140 MMOL/L (ref 136–145)
TOTAL PROTEIN: 6.8 G/DL (ref 6.4–8.2)
TRIGL SERPL-MCNC: 190 MG/DL (ref 0–150)
TSH REFLEX: 2.31 UIU/ML (ref 0.27–4.2)
VITAMIN D 25-HYDROXY: 37.3 NG/ML
VLDLC SERPL CALC-MCNC: 38 MG/DL
WBC # BLD: 6.6 K/UL (ref 4–11)

## 2022-03-21 PROCEDURE — 36415 COLL VENOUS BLD VENIPUNCTURE: CPT | Performed by: NURSE PRACTITIONER

## 2022-03-22 LAB
ORGANISM: ABNORMAL
URINE CULTURE, ROUTINE: ABNORMAL

## 2022-04-18 ENCOUNTER — HOSPITAL ENCOUNTER (OUTPATIENT)
Dept: MAMMOGRAPHY | Age: 57
Discharge: HOME | End: 2022-04-23

## 2022-04-18 VITALS — HEIGHT: 63 IN | WEIGHT: 274 LBS | BODY MASS INDEX: 48.55 KG/M2

## 2022-04-18 DIAGNOSIS — Z12.31 SCREENING MAMMOGRAM FOR BREAST CANCER: ICD-10-CM

## 2022-04-18 PROCEDURE — 77067 SCR MAMMO BI INCL CAD: CPT

## 2022-04-18 PROCEDURE — 77063 BREAST TOMOSYNTHESIS BI: CPT

## 2022-05-04 ENCOUNTER — APPOINTMENT (OUTPATIENT)
Dept: GENERAL RADIOLOGY | Age: 57
End: 2022-05-04
Payer: COMMERCIAL

## 2022-05-04 ENCOUNTER — HOSPITAL ENCOUNTER (EMERGENCY)
Age: 57
Discharge: HOME | End: 2022-05-04
Attending: EMERGENCY MEDICINE
Payer: COMMERCIAL

## 2022-05-04 VITALS
OXYGEN SATURATION: 98 % | RESPIRATION RATE: 20 BRPM | SYSTOLIC BLOOD PRESSURE: 120 MMHG | WEIGHT: 281 LBS | DIASTOLIC BLOOD PRESSURE: 60 MMHG | BODY MASS INDEX: 49.79 KG/M2 | TEMPERATURE: 97.9 F | HEIGHT: 63 IN | HEART RATE: 80 BPM

## 2022-05-04 DIAGNOSIS — J40 COMPLICATED BRONCHITIS: Primary | ICD-10-CM

## 2022-05-04 LAB
RAPID INFLUENZA  B AGN: NEGATIVE
RAPID INFLUENZA A AGN: NEGATIVE
SARS-COV-2, NAAT: NOT DETECTED

## 2022-05-04 PROCEDURE — 87804 INFLUENZA ASSAY W/OPTIC: CPT

## 2022-05-04 PROCEDURE — 71046 X-RAY EXAM CHEST 2 VIEWS: CPT

## 2022-05-04 PROCEDURE — 6370000000 HC RX 637 (ALT 250 FOR IP): Performed by: EMERGENCY MEDICINE

## 2022-05-04 PROCEDURE — 87635 SARS-COV-2 COVID-19 AMP PRB: CPT

## 2022-05-04 PROCEDURE — 99284 EMERGENCY DEPT VISIT MOD MDM: CPT

## 2022-05-04 RX ORDER — PREDNISONE 50 MG/1
50 TABLET ORAL DAILY
Qty: 5 TABLET | Refills: 0 | Status: SHIPPED | OUTPATIENT
Start: 2022-05-04 | End: 2022-05-09

## 2022-05-04 RX ORDER — HYDROCHLOROTHIAZIDE 25 MG/1
25 TABLET ORAL DAILY
COMMUNITY
End: 2022-10-14

## 2022-05-04 RX ORDER — AZITHROMYCIN 250 MG/1
250 TABLET, FILM COATED ORAL DAILY
Qty: 4 TABLET | Refills: 0 | Status: SHIPPED | OUTPATIENT
Start: 2022-05-04 | End: 2022-05-08

## 2022-05-04 RX ORDER — AZITHROMYCIN 250 MG/1
500 TABLET, FILM COATED ORAL ONCE
Status: COMPLETED | OUTPATIENT
Start: 2022-05-04 | End: 2022-05-04

## 2022-05-04 RX ORDER — AZITHROMYCIN 250 MG/1
500 TABLET, FILM COATED ORAL DAILY
Status: DISCONTINUED | OUTPATIENT
Start: 2022-05-04 | End: 2022-05-04

## 2022-05-04 RX ORDER — BENZONATATE 100 MG/1
100 CAPSULE ORAL 3 TIMES DAILY PRN
Qty: 30 CAPSULE | Refills: 0 | Status: SHIPPED | OUTPATIENT
Start: 2022-05-04 | End: 2022-05-11

## 2022-05-04 RX ORDER — ALBUTEROL SULFATE 90 UG/1
2 AEROSOL, METERED RESPIRATORY (INHALATION) 4 TIMES DAILY PRN
Qty: 18 G | Refills: 0 | Status: SHIPPED | OUTPATIENT
Start: 2022-05-04

## 2022-05-04 RX ORDER — IPRATROPIUM BROMIDE AND ALBUTEROL SULFATE 2.5; .5 MG/3ML; MG/3ML
1 SOLUTION RESPIRATORY (INHALATION) ONCE
Status: COMPLETED | OUTPATIENT
Start: 2022-05-04 | End: 2022-05-04

## 2022-05-04 RX ADMIN — IPRATROPIUM BROMIDE AND ALBUTEROL SULFATE 1 AMPULE: .5; 3 SOLUTION RESPIRATORY (INHALATION) at 04:05

## 2022-05-04 RX ADMIN — AZITHROMYCIN 500 MG: 250 TABLET, FILM COATED ORAL at 04:05

## 2022-05-04 RX ADMIN — PREDNISONE 50 MG: 20 TABLET ORAL at 04:05

## 2022-05-04 ASSESSMENT — PAIN SCALES - GENERAL
PAINLEVEL_OUTOF10: 8
PAINLEVEL_OUTOF10: 5

## 2022-05-04 ASSESSMENT — PAIN - FUNCTIONAL ASSESSMENT: PAIN_FUNCTIONAL_ASSESSMENT: 0-10

## 2022-05-04 NOTE — ED PROVIDER NOTES
CHIEF COMPLAINT  Cough (Pt ambulates into ED with complaints of cough and congestion X 3 weeks. States she has tried multiple medication over the counter with no relief. States she has been coughing so hard it makes her ribs hurt.)      HISTORY OF PRESENT ILLNESS  Anne Sanchez is a 64 y.o. female with a history of hypertension tobacco use who presents emergency department for evaluation of cough, congestion. Patient states that she has had intermittent cough, congestion over the past several weeks. No shortness of breath. She states that she has had multiple over-the-counter medicines without significant relief. She states that she has had bilateral rib pain from coughing. She denies any fevers. Denies any dyspnea on exertion no leg swelling. No known sick contacts. She denies any muscle aches. She has not had any pulmonary function test performed in the past.  Past Medical History:   Diagnosis Date    Back pain     Gastroesophageal reflux disease without esophagitis 2019    Hypertension     Ingrown toenail 2021    Patella fracture 2015    Plantar fasciitis      Past Surgical History:   Procedure Laterality Date     SECTION      OTHER SURGICAL HISTORY  10/18/2021    Matrixectomy     Family History   Problem Relation Age of Onset    Diabetes Mother     High Blood Pressure Mother     High Blood Pressure Father     High Blood Pressure Sister      Social History     Socioeconomic History    Marital status:      Spouse name: Not on file    Number of children: Not on file    Years of education: Not on file    Highest education level: Not on file   Occupational History    Not on file   Tobacco Use    Smoking status: Current Every Day Smoker     Packs/day: 0.50     Years: 20.00     Pack years: 10.00     Types: Cigarettes    Smokeless tobacco: Never Used   Vaping Use    Vaping Use: Never used   Substance and Sexual Activity    Alcohol use: No    Drug use:  No  Sexual activity: Yes     Partners: Male   Other Topics Concern    Not on file   Social History Narrative    Not on file     Social Determinants of Health     Financial Resource Strain: Low Risk     Difficulty of Paying Living Expenses: Not hard at all   Food Insecurity: No Food Insecurity    Worried About Running Out of Food in the Last Year: Never true    [address] in the Last Year: Never true   Transportation Needs:     Lack of Transportation (Medical): Not on file    Lack of Transportation (Non-Medical): Not on file   Physical Activity:     Days of Exercise per Week: Not on file    Minutes of Exercise per Session: Not on file   Stress:     Feeling of Stress : Not on file   Social Connections:     Frequency of Communication with Friends and Family: Not on file    Frequency of Social Gatherings with Friends and Family: Not on file    Attends Sabianist Services: Not on file    Active Member of [address] [place] [address] [employer] or Organizations: Not on file    Attends Club or Organization Meetings: Not on file    Marital Status: Not on file   Intimate Partner Violence:     Fear of Current or Ex-Partner: Not on file    Emotionally Abused: Not on file    Physically Abused: Not on file    Sexually Abused: Not on file   Housing Stability:     Unable to Pay for Housing in the Last Year: Not on file    Number of [place] in the Last Year: Not on file    Unstable Housing in the Last Year: Not on file     No current facility-administered medications for this encounter.      Current Outpatient Medications   Medication Sig Dispense Refill    hydroCHLOROthiazide (HYDRODIURIL) 25 MG tablet Take 25 mg by mouth daily      albuterol sulfate HFA (VENTOLIN HFA) 108 (90 Base) MCG/ACT inhaler Inhale 2 puffs into the lungs 4 times daily as needed for Wheezing 18 g 0    azithromycin (ZITHROMAX) 250 MG tablet Take 1 tablet by mouth daily for 4 days 4 tablet 0    predniSONE (DELTASONE) 50 MG tablet Take 1 tablet by mouth daily for 5 days 5 tablet 0    benzonatate (TESSALON) 100 MG capsule Take 1 capsule by mouth 3 times daily as needed for Cough 30 capsule 0    traZODone (DESYREL) 50 MG tablet Take 1 tablet by mouth nightly as needed for Sleep 30 tablet 5    lisinopril-hydroCHLOROthiazide (PRINZIDE;ZESTORETIC) 20-25 MG per tablet TAKE ONE TABLET BY MOUTH DAILY 30 tablet 5    venlafaxine (EFFEXOR XR) 150 MG extended release capsule TAKE ONE CAPSULE BY MOUTH DAILY 30 capsule 5    Cholecalciferol (VITAMIN D3) 1.25 MG (55148 UT) CAPS TAKE 1 CAPSULE BY MOUTH ONCE WEEKLY 4 capsule 5     No Known Allergies    REVIEW OF SYSTEMS  Positive and pertinent negatives as per HPI. All other systems were reviewed and are negative. PHYSICAL EXAM  BP (!) 121/54   Pulse 83   Temp 97.9 °F (36.6 °C) (Oral)   Resp 20   Ht 5' 3\" (1.6 m)   Wt 281 lb (127.5 kg)   LMP 04/10/2012 (Exact Date)   SpO2 98%   BMI 49.78 kg/m²   GENERAL APPEARANCE: Awake and alert. Cooperative. HEAD: Normocephalic. Atraumatic. HEART: RRR. No harsh murmurs. Intact radial pulses 2+ bilaterally. LUNGS: Respirations unlabored without accessory muscle use. Speaking comfortably in full sentences. There is some mild coarse breath sounds bilaterally slightly diminished on the left  ABDOMEN: Soft. Non-distended. Non-tender. No guarding or rebound. EXTREMITIES: No peripheral edema. No acute deformities. SKIN: Warm and dry. No acute rashes. NEUROLOGICAL: Alert and oriented X 3. No focal deficits  LABS  I have reviewed all labs for this visit.    Results for orders placed or performed during the hospital encounter of 05/04/22   COVID-19, Rapid    Specimen: Nasopharyngeal Swab   Result Value Ref Range    SARS-CoV-2, NAAT Not Detected Not Detected   Rapid influenza A/B antigens    Specimen: Nasopharyngeal   Result Value Ref Range    Rapid Influenza A Ag Negative Negative    Rapid Influenza B Ag Negative Negative         RADIOLOGY  X-RAYS:  I have reviewed radiologic plain film image(s). ALL OTHER NON-PLAIN FILM IMAGES SUCH AS CT, ULTRASOUND AND MRI HAVE BEEN READ BY THE RADIOLOGIST. XR CHEST (2 VW)   Final Result   No acute cardiopulmonary abnormality. No results found. ED COURSE/MDM  Patient seen and evaluated. Patient presents with several weeks of cough, congestion. Patient arrives with stable vital signs. She is afebrile, no significant respiratory distress, she does have some mildly decreased breath sounds on the left and coarse breath sounds. In the setting of tobacco use for the past 20+ years, I am concerned that she may have undiagnosed COPD. COVID and flu testing was negative. Chest x-ray without focal process. Patient underwent breathing treatments and her lung sounds do sound improved after breathing treatment. She was given azithromycin, steroid course for potential complicated bronchitis and she was also prescribed an inhaler to use if she were to become short of breath. Patient advised on PCP follow-up for consideration of pulmonary function test and continued management. She will be prescribed Tessalon Perles for cough. The patient will be discharged from the emergency department. The patient was counseled on their diagnosis and any medications prescribed. They were advised on the need for PCP followup. They were counseled on the need to return to the emergency department if any of their symptoms were to worsen, change or have any other concerns. Discharged in stable condition. Patient was given scripts for the following medications. I counseled patient how to take these medications.    New Prescriptions    ALBUTEROL SULFATE HFA (VENTOLIN HFA) 108 (90 BASE) MCG/ACT INHALER    Inhale 2 puffs into the lungs 4 times daily as needed for Wheezing    AZITHROMYCIN (ZITHROMAX) 250 MG TABLET    Take 1 tablet by mouth daily for 4 days    BENZONATATE (TESSALON) 100 MG CAPSULE    Take 1 capsule by mouth 3 times daily as needed for Cough PREDNISONE (DELTASONE) 50 MG TABLET    Take 1 tablet by mouth daily for 5 days       CLINICAL IMPRESSION  1. Complicated bronchitis        Blood pressure (!) 121/54, pulse 83, temperature 97.9 °F (36.6 °C), temperature source Oral, resp. rate 20, height 5' 3\" (1.6 m), weight 281 lb (127.5 kg), last menstrual period 04/10/2012, SpO2 98 %, not currently breastfeeding. Lindsey was discharged to home in stable condition. This chart was generated in part by using Dragon Dictation system and may contain errors related to that system including errors in grammar, punctuation, and spelling, as well as words and phrases that may be inappropriate. If there are any questions or concerns please feel free to contact the dictating provider for clarification.      Bobbi Schwartz MD  05/04/22 [id number]

## 2022-10-05 DIAGNOSIS — F33.0 MILD EPISODE OF RECURRENT MAJOR DEPRESSIVE DISORDER (HCC): ICD-10-CM

## 2022-10-05 DIAGNOSIS — I10 ESSENTIAL HYPERTENSION: ICD-10-CM

## 2022-10-05 RX ORDER — LISINOPRIL AND HYDROCHLOROTHIAZIDE 25; 20 MG/1; MG/1
TABLET ORAL
Qty: 28 TABLET | Refills: 0 | Status: SHIPPED | OUTPATIENT
Start: 2022-10-05 | End: 2022-10-14 | Stop reason: SDUPTHER

## 2022-10-05 RX ORDER — VENLAFAXINE HYDROCHLORIDE 150 MG/1
CAPSULE, EXTENDED RELEASE ORAL
Qty: 28 CAPSULE | Refills: 0 | Status: SHIPPED | OUTPATIENT
Start: 2022-10-05 | End: 2022-10-14 | Stop reason: SDUPTHER

## 2022-10-05 NOTE — TELEPHONE ENCOUNTER
Needs a 6-month follow-up because she started trazodone at last appointment.   Patient can be scheduled as a virtual visit

## 2022-10-05 NOTE — TELEPHONE ENCOUNTER
Future Appointments   Date Time Provider Kirk Neri   10/14/2022  1:00 PM ULISES Rader - CNP [facility]

## 2022-10-14 ENCOUNTER — TELEMEDICINE (OUTPATIENT)
Dept: FAMILY MEDICINE CLINIC | Age: 57
End: 2022-10-14
Payer: COMMERCIAL

## 2022-10-14 DIAGNOSIS — N39.46 MIXED STRESS AND URGE URINARY INCONTINENCE: ICD-10-CM

## 2022-10-14 DIAGNOSIS — I10 ESSENTIAL HYPERTENSION: ICD-10-CM

## 2022-10-14 DIAGNOSIS — F51.01 PRIMARY INSOMNIA: ICD-10-CM

## 2022-10-14 DIAGNOSIS — F41.9 ANXIETY: ICD-10-CM

## 2022-10-14 DIAGNOSIS — R35.0 URINARY FREQUENCY: ICD-10-CM

## 2022-10-14 DIAGNOSIS — F33.0 MILD EPISODE OF RECURRENT MAJOR DEPRESSIVE DISORDER (HCC): Primary | ICD-10-CM

## 2022-10-14 PROCEDURE — 99214 OFFICE O/P EST MOD 30 MIN: CPT | Performed by: NURSE PRACTITIONER

## 2022-10-14 RX ORDER — BUSPIRONE HYDROCHLORIDE 10 MG/1
10 TABLET ORAL 3 TIMES DAILY PRN
Qty: 30 TABLET | Refills: 2 | Status: SHIPPED | OUTPATIENT
Start: 2022-10-14

## 2022-10-14 RX ORDER — LISINOPRIL AND HYDROCHLOROTHIAZIDE 25; 20 MG/1; MG/1
TABLET ORAL
Qty: 28 TABLET | Refills: 2 | Status: SHIPPED | OUTPATIENT
Start: 2022-10-14

## 2022-10-14 RX ORDER — VENLAFAXINE HYDROCHLORIDE 150 MG/1
CAPSULE, EXTENDED RELEASE ORAL
Qty: 28 CAPSULE | Refills: 2 | Status: SHIPPED | OUTPATIENT
Start: 2022-10-14

## 2022-10-14 ASSESSMENT — ENCOUNTER SYMPTOMS
GASTROINTESTINAL NEGATIVE: 1
NAUSEA: 0
RESPIRATORY NEGATIVE: 1
SHORTNESS OF BREATH: 0
ALLERGIC/IMMUNOLOGIC NEGATIVE: 1
BLOOD IN STOOL: 0
EYES NEGATIVE: 1
ABDOMINAL PAIN: 0
ANAL BLEEDING: 0

## 2022-10-14 NOTE — PROGRESS NOTES
10/14/2022    TELEHEALTH EVALUATION -- Audio/Visual (During [id number] public health emergency)    HPI:    Reed Coffman (:  1965) has requested an audio/video evaluation for the following concern(s):  Chief Complaint   Patient presents with    Other     Follow up on Trazodone     Urinary Incontinence  She  is an 62 y.o. female seen for  incontinence problems. Symptoms were first noted several months ago. Symptoms include: frequent urination ( 8 times daily), voids in small amounts, urge to urinate recurs again shortly following micturition, urge to urinate comes with little or no warning, and sensation of incomplete emptying of bladder. She usually wears nothing for protection. Previous treatment includes: none. She denies hematuria, dysuria, recurrent urinary tract infections, vaginal burning, urolithiasis, neurologic disease, rectal incontinence, sense of pelvic prolapse, flank pain, and fevers. Insomnia:  Current treatment: avoiding heavy meal before bedtime, avoiding vigorous physical exercise prior to bed, going to sleep at the same time each night and prescription sleep aid- trazodone- 50 mg daily, which has been ineffective. she quit taking medication d/t feeling groggy. At this time she does not want any further medication    Hypertension:    The patient is here for routine follow up on HTN. Patient denies chest pain, shortness of breath, headache, lightheadedness, blurred vision, peripheral edema, palpitations, dry cough, and fatigue. She is adherent to a low sodium diet. Patient denies antihypertensive medication side effects of: fatigue, dry cough, swelling in ankles, weakness, orthostatic lightheadedness, myalgias, rash, nausea, abdominal discomfort, headaches, insomnia, weight gain, palpitations, slow heart rate, excessive urination, depression, and wheezing. Blood pressure typically runs 120's/70's outside of the office.                                             Sodium (mmol/L) Date Value   03/21/2022 140    BUN (mg/dL)   Date Value   03/21/2022 18    Glucose (mg/dL)   Date Value   03/21/2022 102 (H)      Potassium (mmol/L)   Date Value   03/21/2022 4.2    Creatinine (mg/dL)   Date Value   03/21/2022 0.6         BP Readings from Last 3 Encounters:   05/04/22 120/60   03/17/22 108/74   03/15/21 122/88     Mood Disorder:  Patient presents for follow-up of depression and anxiety disorder. Current complaints include: See PHQ9 below . She denies tearfulness, decreased libido, irritability, excessive worry, panic attacks, obsessive thoughts, compulsive behaviors, increased use of drugs or alcohol, suicidal thoughts or behavior, and impaired memory. Symptoms/signs of rambo: none. External stressors: employment concern. Current treatment includes: Effexor- xr 150 mg daily. Medication side effects: none. PHQ-9  3/17/2022 3/15/2021 10/23/2020 2/17/2020 7/1/2019 3/8/2019 1/16/2019   Little interest or pleasure in doing things 1 3 0 1 1 1 0   Feeling down, depressed, or hopeless 1 1 1 0 1 1 1   Trouble falling or staying asleep, or sleeping too much 2 3 1 2 3 2 -   Feeling tired or having little energy 2 3 1 1 2 1 -   Poor appetite or overeating 2 3 2 1 3 1 -   Feeling bad about yourself - or that you are a failure or have let yourself or your family down 1 1 0 0 1 1 -   Trouble concentrating on things, such as reading the newspaper or watching television 1 2 1 0 1 1 -   Moving or speaking so slowly that other people could have noticed. Or the opposite - being so fidgety or restless that you have been moving around a lot more than usual 0 0 0 0 1 1 -   Thoughts that you would be better off dead, or of hurting yourself in some way 0 0 0 0 0 0 -   PHQ-2 Score 2 4 1 1 2 2 1   PHQ-9 Total Score 10 16 6 5 13 9 1   If you checked off any problems, how difficult have these problems made it for you to do your work, take care of things at home, or get along with other people?  1 2 1 1 0 0 - Interpretation of Total Score Total Score Depression Severity: 1-4 = Minimal depression, 5-9 = Mild depression, 10-14 = Moderate depression, 15-19 = Moderately severe depression, 20-27 = Severe depression        Review of Systems   Constitutional: Negative. Negative for appetite change, fatigue and unexpected weight change. HENT: Negative. Eyes: Negative. Respiratory: Negative. Negative for shortness of breath. Cardiovascular: Negative. Negative for chest pain, palpitations and leg swelling. Gastrointestinal: Negative. Negative for abdominal pain, anal bleeding, blood in stool and nausea. Endocrine: Negative. Genitourinary:  Positive for decreased urine volume, frequency and urgency. Negative for difficulty urinating, dysuria, enuresis, flank pain, hematuria, menstrual problem, pelvic pain, vaginal discharge and vaginal pain. Musculoskeletal: Negative. Skin: Negative. Negative for rash. Allergic/Immunologic: Negative. Neurological: Negative. Negative for dizziness, syncope, light-headedness and numbness. Hematological: Negative. Does not bruise/bleed easily. Psychiatric/Behavioral:  Positive for sleep disturbance. Negative for dysphoric mood, self-injury and suicidal ideas. The patient is nervous/anxious. All other systems reviewed and are negative. Prior to Visit Medications    Medication Sig Taking?  Authorizing Provider   lisinopril-hydroCHLOROthiazide (PRINZIDE;ZESTORETIC) 20-25 MG per tablet TAKE ONE TABLET BY MOUTH DAILY Yes ULISES Lopez CNP   venlafaxine (EFFEXOR XR) 150 MG extended release capsule TAKE ONE CAPSULE BY MOUTH DAILY Yes ULISES Lopez CNP   hydroCHLOROthiazide (HYDRODIURIL) 25 MG tablet Take 25 mg by mouth daily Yes Historical Provider, MD   albuterol sulfate HFA (VENTOLIN HFA) 108 (90 Base) MCG/ACT inhaler Inhale 2 puffs into the lungs 4 times daily as needed for Wheezing Yes Karlee Hernandez MD   Cholecalciferol (VITAMIN D3) 1.25 MG (94269 UT) CAPS TAKE 1 CAPSULE BY MOUTH ONCE WEEKLY Yes Keith Sorensen, APRN - CNP   traZODone (DESYREL) 50 MG tablet Take 1 tablet by mouth nightly as needed for Sleep  Patient not taking: Reported on 10/14/2022  Keith Sorensen, APRN - CNP       No Known Allergies    PHYSICAL EXAMINATION:  [ INSTRUCTIONS:  \"[x]\" Indicates a positive item  \"[]\" Indicates a negative item  -- DELETE ALL ITEMS NOT EXAMINED]  Vital Signs: (As obtained by patient/caregiver or practitioner observation)     Blood pressure-          Heart rate-         Respiratory rate-         Temperature-            Pulse oximetry-      Constitutional: [x] Appears well-developed and well-nourished [x] No apparent distress                            [] Abnormal-   Mental status  [x] Alert and awake  [x] Oriented to person/place/time [x]Able to follow commands       Eyes:  EOM    [x]  Normal  [] Abnormal-  Sclera  [x]  Normal  [] Abnormal -         Discharge [x]  None visible  [] Abnormal -     HENT:   [x] Normocephalic, atraumatic.   [] Abnormal   [x] Mouth/Throat: Mucous membranes are moist.      External Ears [x] Normal  [] Abnormal-      Neck: [x] No visualized mass      Pulmonary/Chest: [x] Respiratory effort normal.  [x] No visualized signs of difficulty breathing or respiratory distress        [] Abnormal-      Musculoskeletal:   [x] Normal gait with no signs of ataxia         [x] Normal range of motion of neck        [] Abnormal-         Neurological:        [x] No Facial Asymmetry (Cranial nerve 7 motor function) (limited exam to video visit)                       [x] No gaze palsy        [] Abnormal-         Skin:                     [x] No significant exanthematous lesions or discoloration noted on facial skin         [] Abnormal-                                  Psychiatric:           [] Normal Affect [x] No Hallucinations        [x] Abnormal- tearful     Other pertinent observable physical exam findings- ASSESSMENT/PLAN:  1. Mild episode of recurrent major depressive disorder ([facility] [address]  Feels depression controlled but not anxiety at this time d/t stress at work  continue  - venlafaxine (EFFEXOR XR) 150 MG extended release capsule; TAKE ONE CAPSULE BY MOUTH DAILY  Dispense: 28 capsule; Refill: 2    2. Anxiety  Start   - busPIRone (BUSPAR) 10 MG tablet; Take 1 tablet by mouth 3 times daily as needed (anxiety, stress)  Dispense: 30 tablet; Refill: 2  Educated if patient develops SI/HI/rambo to call 911 or go to ER. Discussed use, benefit, risks and side effects of prescribed medications. Barriers to compliance discussed. All patient questions answered. Pt voiced understanding. 3. Urinary frequency  - POCT Urine with Microscopic; Future  4. Mixed stress and urge urinary incontinence  - POCT Urine with Microscopic; Future  Discussed need for urology if + microscopic hematuria  Otherwise discussed urology consult for evaluation of urethral strictures vs trial of medication for over active bladder  Patient would like to wait and then will decide which option she would like to pursue once u/a has been completed    5. Primary insomnia   she quit taking trazodone medication d/t feeling groggy. At this time she does not want any further medication    6. Essential hypertension  Stable   refill  - lisinopril-hydroCHLOROthiazide (PRINZIDE;ZESTORETIC) 20-25 MG per tablet; TAKE ONE TABLET BY MOUTH DAILY  Dispense: 28 tablet; Refill: 2  Patient has been instructed call the office immediately with new symptoms, change in symptoms or worseningof symptoms. If this is not feasible, patient is instructed to report to the emergency room. Medication profile reviewed. Medication side effects and possible impairments from medications were discussed as applicable. Allergies were reviewed. Health maintenance was reviewed and updated as appropriate.      Return in about 6 months (around 4/14/2023), or if symptoms worsen or fail to improve, [facility]. [place], was evaluated through a synchronous (real-time) audio-video encounter. The patient (or guardian if applicable) is aware that this is a billable service, which includes applicable co-pays. This Virtual Visit was conducted with patient's (and/or legal guardian's) consent. The visit was conducted pursuant to the emergency declaration under the [address] [place] [address], [address] [place] [address] authority and the [employer] General Act. Patient identification was verified, and a caregiver was present when appropriate. The patient was located at Home: [address]  [address] [address]. Provider was located at Other: [place], [facility] . Total time spent on this encounter:  33 minutes    --ULISES Morgan CNP on 10/14/2022 at 1:00 PM    An electronic signature was used to authenticate this note.

## 2022-10-17 ENCOUNTER — NURSE ONLY (OUTPATIENT)
Dept: FAMILY MEDICINE CLINIC | Age: 57
End: 2022-10-17
Payer: COMMERCIAL

## 2022-10-17 DIAGNOSIS — R39.9 UTI SYMPTOMS: Primary | ICD-10-CM

## 2022-10-17 LAB
BILIRUBIN, POC: ABNORMAL
BLOOD URINE, POC: ABNORMAL
CLARITY, POC: ABNORMAL
COLOR, POC: YELLOW
GLUCOSE URINE, POC: ABNORMAL
KETONES, POC: ABNORMAL
LEUKOCYTE EST, POC: ABNORMAL
NITRITE, POC: POSITIVE
PH, POC: 6
PROTEIN, POC: ABNORMAL
SPECIFIC GRAVITY, POC: 1.03
UROBILINOGEN, POC: 0.2

## 2022-10-17 PROCEDURE — 81002 URINALYSIS NONAUTO W/O SCOPE: CPT | Performed by: NURSE PRACTITIONER

## 2022-10-17 RX ORDER — SULFAMETHOXAZOLE AND TRIMETHOPRIM 800; 160 MG/1; MG/1
1 TABLET ORAL 2 TIMES DAILY
Qty: 10 TABLET | Refills: 0 | Status: SHIPPED | OUTPATIENT
Start: 2022-10-17 | End: 2022-10-22

## 2022-10-20 LAB
ORGANISM: ABNORMAL
URINE CULTURE, ROUTINE: ABNORMAL

## 2022-10-20 RX ORDER — NITROFURANTOIN 25; 75 MG/1; MG/1
100 CAPSULE ORAL 2 TIMES DAILY
Qty: 14 CAPSULE | Refills: 0 | Status: SHIPPED | OUTPATIENT
Start: 2022-10-20 | End: 2022-10-27

## 2022-11-03 DIAGNOSIS — I10 ESSENTIAL HYPERTENSION: ICD-10-CM

## 2022-11-03 DIAGNOSIS — F33.0 MILD EPISODE OF RECURRENT MAJOR DEPRESSIVE DISORDER (HCC): ICD-10-CM

## 2022-11-03 RX ORDER — VENLAFAXINE HYDROCHLORIDE 150 MG/1
CAPSULE, EXTENDED RELEASE ORAL
Qty: 28 CAPSULE | Refills: 2 | OUTPATIENT
Start: 2022-11-03

## 2022-11-03 RX ORDER — LISINOPRIL AND HYDROCHLOROTHIAZIDE 25; 20 MG/1; MG/1
TABLET ORAL
Qty: 28 TABLET | Refills: 2 | OUTPATIENT
Start: 2022-11-03

## 2022-11-12 DIAGNOSIS — F41.9 ANXIETY: ICD-10-CM

## 2022-11-14 RX ORDER — BUSPIRONE HYDROCHLORIDE 10 MG/1
TABLET ORAL
Qty: 30 TABLET | Refills: 4 | Status: SHIPPED | OUTPATIENT
Start: 2022-11-14

## 2023-01-26 ENCOUNTER — OFFICE VISIT (OUTPATIENT)
Dept: FAMILY MEDICINE CLINIC | Age: 58
End: 2023-01-26
Payer: COMMERCIAL

## 2023-01-26 VITALS
SYSTOLIC BLOOD PRESSURE: 132 MMHG | BODY MASS INDEX: 51.91 KG/M2 | WEIGHT: 293 LBS | DIASTOLIC BLOOD PRESSURE: 76 MMHG | HEIGHT: 63 IN | HEART RATE: 84 BPM | OXYGEN SATURATION: 98 %

## 2023-01-26 DIAGNOSIS — M25.562 ACUTE PAIN OF LEFT KNEE: Primary | ICD-10-CM

## 2023-01-26 PROCEDURE — 99213 OFFICE O/P EST LOW 20 MIN: CPT | Performed by: NURSE PRACTITIONER

## 2023-01-26 PROCEDURE — 3075F SYST BP GE 130 - 139MM HG: CPT | Performed by: NURSE PRACTITIONER

## 2023-01-26 PROCEDURE — 3078F DIAST BP <80 MM HG: CPT | Performed by: NURSE PRACTITIONER

## 2023-01-26 ASSESSMENT — ENCOUNTER SYMPTOMS
RESPIRATORY NEGATIVE: 1
EYES NEGATIVE: 1
ALLERGIC/IMMUNOLOGIC NEGATIVE: 1
GASTROINTESTINAL NEGATIVE: 1
SHORTNESS OF BREATH: 0
ABDOMINAL PAIN: 0
ANAL BLEEDING: 0
BLOOD IN STOOL: 0
NAUSEA: 0

## 2023-01-26 ASSESSMENT — PATIENT HEALTH QUESTIONNAIRE - PHQ9
7. TROUBLE CONCENTRATING ON THINGS, SUCH AS READING THE NEWSPAPER OR WATCHING TELEVISION: 1
3. TROUBLE FALLING OR STAYING ASLEEP: 2
9. THOUGHTS THAT YOU WOULD BE BETTER OFF DEAD, OR OF HURTING YOURSELF: 0
1. LITTLE INTEREST OR PLEASURE IN DOING THINGS: 1
4. FEELING TIRED OR HAVING LITTLE ENERGY: 1
10. IF YOU CHECKED OFF ANY PROBLEMS, HOW DIFFICULT HAVE THESE PROBLEMS MADE IT FOR YOU TO DO YOUR WORK, TAKE CARE OF THINGS AT HOME, OR GET ALONG WITH OTHER PEOPLE: 1
5. POOR APPETITE OR OVEREATING: 2
6. FEELING BAD ABOUT YOURSELF - OR THAT YOU ARE A FAILURE OR HAVE LET YOURSELF OR YOUR FAMILY DOWN: 1
SUM OF ALL RESPONSES TO PHQ9 QUESTIONS 1 & 2: 2
SUM OF ALL RESPONSES TO PHQ QUESTIONS 1-9: 9
2. FEELING DOWN, DEPRESSED OR HOPELESS: 1
SUM OF ALL RESPONSES TO PHQ QUESTIONS 1-9: 9
8. MOVING OR SPEAKING SO SLOWLY THAT OTHER PEOPLE COULD HAVE NOTICED. OR THE OPPOSITE, BEING SO FIGETY OR RESTLESS THAT YOU HAVE BEEN MOVING AROUND A LOT MORE THAN USUAL: 0

## 2023-01-26 NOTE — PROGRESS NOTES
[facility] [place] [facility]  [facility]  [address] [place] [id number]  Dept: [phone]  Dept Fax: [phone]  Loc: [phone]    Roya Barton is a 57 y.o. female who presents today for her medical conditions/complaints as noted below.  Roya Barton is c/o of Other (Pain behind left knee.  Feel a week ago and pain is getting worse)       Subjective:     Chief Complaint   Patient presents with    Other     Pain behind left knee.  Feel a week ago and pain is getting worse       Knee Pain   The incident occurred more than 1 week ago. The incident occurred at home. The injury mechanism was an eversion injury. The pain is present in the left knee. The quality of the pain is described as stabbing and aching. The pain is moderate. The pain has been Intermittent since onset. Associated symptoms include a loss of motion. Pertinent negatives include no inability to bear weight (can walk but slowly), loss of sensation, muscle weakness, numbness or tingling. Associated symptoms comments: Will give out at times. Hurts behind the knee and feels like it will pop  . She reports no foreign bodies present. The symptoms are aggravated by movement and weight bearing. She has tried rest, non-weight bearing and heat (knee brace) for the symptoms. The treatment provided no relief.     Past Medical History:   Diagnosis Date    Back pain     Gastroesophageal reflux disease without esophagitis 1/16/2019    Hypertension     Ingrown toenail 11/2/2021    Patella fracture 7/1/2015    Plantar fasciitis          Review of Systems   Constitutional: Negative.  Negative for appetite change, fatigue and unexpected weight change.   HENT: Negative.     Eyes: Negative.    Respiratory: Negative.  Negative for shortness of breath.    Cardiovascular: Negative.  Negative for chest pain, palpitations and leg swelling.   Gastrointestinal: Negative.  Negative for abdominal pain, anal bleeding, blood in stool and nausea.  Endocrine: Negative. Genitourinary: Negative. Negative for hematuria. Musculoskeletal:  Positive for arthralgias. Skin: Negative. Negative for rash. Allergic/Immunologic: Negative. Neurological: Negative. Negative for dizziness, tingling, syncope, light-headedness and numbness. Hematological: Negative. Does not bruise/bleed easily. Psychiatric/Behavioral: Negative. All other systems reviewed and are negative. Current Outpatient Medications   Medication Sig Dispense Refill    busPIRone (BUSPAR) 10 MG tablet TAKE ONE TABLET BY MOUTH THREE TIMES A DAY AS NEEDED FOR ANXIETY AND STRESS 30 tablet 4    lisinopril-hydroCHLOROthiazide (PRINZIDE;ZESTORETIC) 20-25 MG per tablet TAKE ONE TABLET BY MOUTH DAILY 28 tablet 2    venlafaxine (EFFEXOR XR) 150 MG extended release capsule TAKE ONE CAPSULE BY MOUTH DAILY 28 capsule 2    albuterol sulfate HFA (VENTOLIN HFA) 108 (90 Base) MCG/ACT inhaler Inhale 2 puffs into the lungs 4 times daily as needed for Wheezing 18 g 0    Cholecalciferol (VITAMIN D3) 1.25 MG (76557 UT) CAPS TAKE 1 CAPSULE BY MOUTH ONCE WEEKLY 4 capsule 5     No current facility-administered medications for this visit. No changes in past medical history, past surgical history, social history, orfamily history were noted during the patient encounter unless specifically listed above. All updates of past medical history, past surgical history, social history, or family history were reviewed personally by me duringthe office visit. All problems listed in the assessment are stable unless noted otherwise. Medication profile reviewed personally by me during the office visit. Medication side effects and possible impairments frommedications were discussed as applicable. Objective:     Physical Exam  Constitutional:       General: She is not in acute distress. Appearance: Normal appearance. She is well-developed. She is not toxic-appearing.    HENT:      Head: Normocephalic and atraumatic. Right Ear: Hearing, tympanic membrane and ear canal normal.      Left Ear: Hearing, tympanic membrane and ear canal normal.      Nose: Nose normal.      Mouth/Throat:      Pharynx: Uvula midline. Eyes:      General: Lids are normal.      Conjunctiva/sclera: Conjunctivae normal.   Cardiovascular:      Rate and Rhythm: Normal rate and regular rhythm. Pulses: Normal pulses. Heart sounds: Normal heart sounds. Pulmonary:      Effort: Pulmonary effort is normal. No accessory muscle usage or respiratory distress. Breath sounds: Normal breath sounds. Abdominal:      Palpations: Abdomen is soft. Tenderness: There is no abdominal tenderness. Musculoskeletal:      Cervical back: Neck supple. Right knee: Normal.      Left knee: Decreased range of motion (flexion). Tenderness present over the PCL. Normal pulse. Lymphadenopathy:      Head:      Right side of head: No submental or submandibular adenopathy. Left side of head: No submental or submandibular adenopathy. Cervical: No cervical adenopathy. Skin:     General: Skin is warm and dry. Findings: No lesion or rash. Neurological:      Mental Status: She is alert and oriented to person, place, and time. Psychiatric:         Speech: Speech normal.         Behavior: Behavior normal. Behavior is cooperative. /76 (Site: Left Lower Arm, Position: Sitting, Cuff Size: Medium Adult)   Pulse 84   Ht 5' 3\" (1.6 m)   Wt 297 lb (134.7 kg)   LMP 04/10/2012 (Exact Date)   SpO2 98%   BMI 52.61 kg/m²   Body mass index is 52.61 kg/m². BP Readings from Last 2 Encounters:   01/26/23 132/76   05/04/22 120/60       Wt Readings from Last 3 Encounters:   01/26/23 297 lb (134.7 kg)   05/04/22 281 lb (127.5 kg)   04/18/22 274 lb (124.3 kg)       Lab Review   No visits with results within 2 Month(s) from this visit.    Latest known visit with results is:   Nurse Only on 10/17/2022   Component Date Value    Color, UA 10/17/2022 yellow     Clarity, UA 10/17/2022 cloudy     Glucose, UA POC 10/17/2022 neg     Bilirubin, UA 10/17/2022 neg     Ketones, UA 10/17/2022 neg     Spec Grav, UA 10/17/2022 1.030     Blood, UA POC 10/17/2022 large     pH, UA 10/17/2022 6.0     Protein, UA POC 10/17/2022 30mg     Urobilinogen, UA 10/17/2022 0.2     Leukocytes, UA 10/17/2022 moderate     Nitrite, UA 10/17/2022 positive     Organism 10/17/2022 Escherichia coli (A)     Urine Culture, Routine 10/17/2022 >100,000 CFU/ml        No results found for this visit on 01/26/23. Assessment:       1. Acute pain of left knee        No results found for this visit on 01/26/23. Plan:       Santiago Saxena was seen today for other. Diagnoses and all orders for this visit:    Acute pain of left knee  Symptoms present 1 to 2 weeks without injury noted. Patient was given the option to be referred to Ortho but she declined  Patient will continue to wear a brace, rest, ice and elevate. She will call in 2 weeks to let me know how she is doing. If her symptoms have significantly improved then I will let her start doing some home exercises. If there has not been improvement then I will refer her on to Ortho for further evaluation    Patient has been instructed call the office immediately with new symptoms, change in symptoms or worseningof symptoms. If this is not feasible, patient is instructed to report to the emergency room. Medication profile reviewed. Medication side effects and possible impairments from medications were discussed as applicable. Allergies were reviewed. Health maintenance was reviewed and updated as appropriate. Return if symptoms worsen or fail to improve. (Comment: Please note this report has been produced using a combination of typing and speech recognition software and may contain errors related to that system including errors in grammar, punctuation, and spelling, as well as words and phrases that may be inappropriate. If there are any questions or concerns please feel free to contact the dictating provider for clarification.)

## 2023-01-31 DIAGNOSIS — F33.0 MILD EPISODE OF RECURRENT MAJOR DEPRESSIVE DISORDER (HCC): ICD-10-CM

## 2023-01-31 DIAGNOSIS — I10 ESSENTIAL HYPERTENSION: ICD-10-CM

## 2023-01-31 RX ORDER — LISINOPRIL AND HYDROCHLOROTHIAZIDE 25; 20 MG/1; MG/1
TABLET ORAL
Qty: 28 TABLET | Refills: 0 | Status: SHIPPED | OUTPATIENT
Start: 2023-01-31

## 2023-01-31 RX ORDER — VENLAFAXINE HYDROCHLORIDE 150 MG/1
CAPSULE, EXTENDED RELEASE ORAL
Qty: 28 CAPSULE | Refills: 0 | Status: SHIPPED | OUTPATIENT
Start: 2023-01-31

## 2023-01-31 NOTE — TELEPHONE ENCOUNTER
Lisset Pham is requesting refill(s)   Last OV 1014/22 (pertaining to medication)  LR 8/31/22 (per medication requested)  Next office visit scheduled or attempted     Not due for appt at this time

## 2023-02-20 ENCOUNTER — TELEPHONE (OUTPATIENT)
Dept: FAMILY MEDICINE CLINIC | Age: 58
End: 2023-02-20

## 2023-02-20 DIAGNOSIS — M25.562 LEFT KNEE PAIN, UNSPECIFIED CHRONICITY: Primary | ICD-10-CM

## 2023-02-20 NOTE — TELEPHONE ENCOUNTER
Pt states that she is still having issues with her left knee. Was wanting to see if she could get a referral to ortho.

## 2023-03-02 ENCOUNTER — OFFICE VISIT (OUTPATIENT)
Dept: ORTHOPEDIC SURGERY | Age: 58
End: 2023-03-02

## 2023-03-02 VITALS — WEIGHT: 293 LBS | HEIGHT: 63 IN | BODY MASS INDEX: 51.91 KG/M2

## 2023-03-02 DIAGNOSIS — M25.562 LEFT KNEE PAIN, UNSPECIFIED CHRONICITY: Primary | ICD-10-CM

## 2023-03-02 NOTE — PROGRESS NOTES
Date:  3/2/2023    Name:  Alexandria Hoang  Address:  [address]  [address] [facility] [address]    :  1965      Age:   62 y.o.    SSN:  [id number]      Medical Record Number:  [id number]    Reason for Visit:    Chief Complaint    Knee Pain (NP Lt knee/)      DOS:3/2/2023     HPI: Alexandria Hoang is a 62 y.o. female here today for new patient evaluation regarding her left knee. She was referred to me by Chely Holcomb. the patient reports about 5 weeks ago she tripped over one of her cats. She was able to catch herself but ever since then she had a lot of pain to the knee. She did not have any pain to the knee before. She initially brought this up with her PCP and she tried rest for a few weeks without any improvement. The patient reports a feeling of instability when she is on it. She denies locking of the knee. Pain Assessment  Location of Pain: Knee  Location Modifiers: Left  Severity of Pain: 8  Quality of Pain: Sharp, Popping  Duration of Pain: Persistent  Frequency of Pain: Constant  Aggravating Factors: Walking, Bending  Limiting Behavior: Yes  Relieving Factors: Nsaids  Result of Injury: Yes  Work-Related Injury: No  Are there other pain locations you wish to document?: No  ROS: All systems reviewed on patient intake form. Pertinent items are noted in HPI.         Past Medical History:   Diagnosis Date    Back pain     Gastroesophageal reflux disease without esophagitis 2019    Hypertension     Ingrown toenail 2021    Patella fracture 2015    Plantar fasciitis         Past Surgical History:   Procedure Laterality Date     SECTION      OTHER SURGICAL HISTORY  10/18/2021    Matrixectomy       Family History   Problem Relation Age of Onset    Diabetes Mother     High Blood Pressure Mother     High Blood Pressure Father     High Blood Pressure Sister        Social History     Socioeconomic History    Marital status:    Tobacco Use    Smoking status: Every Day Packs/day: 0.50     Years: 20.00     Pack years: 10.00     Types: Cigarettes    Smokeless tobacco: Never   Vaping Use    Vaping Use: Never used   Substance and Sexual Activity    Alcohol use: No    Drug use: No    Sexual activity: Yes     Partners: Male     Social Determinants of Health     Financial Resource Strain: Low Risk     Difficulty of Paying Living Expenses: Not hard at all   Food Insecurity: No Food Insecurity    Worried About Running Out of Food in the Last Year: Never true    Ran Out of Food in the Last Year: Never true       Current Outpatient Medications   Medication Sig Dispense Refill    venlafaxine (EFFEXOR XR) 150 MG extended release capsule TAKE ONE CAPSULE BY MOUTH DAILY 28 capsule 0    lisinopril-hydroCHLOROthiazide (PRINZIDE;ZESTORETIC) 20-25 MG per tablet TAKE ONE TABLET BY MOUTH DAILY 28 tablet 0    busPIRone (BUSPAR) 10 MG tablet TAKE ONE TABLET BY MOUTH THREE TIMES A DAY AS NEEDED FOR ANXIETY AND STRESS 30 tablet 4    albuterol sulfate HFA (VENTOLIN HFA) 108 (90 Base) MCG/ACT inhaler Inhale 2 puffs into the lungs 4 times daily as needed for Wheezing 18 g 0    Cholecalciferol (VITAMIN D3) 1.25 MG (99068 UT) CAPS TAKE 1 CAPSULE BY MOUTH ONCE WEEKLY 4 capsule 5     No current facility-administered medications for this visit. No Known Allergies    Vital signs:  Ht 5' 3\" (1.6 m)   Wt 297 lb (134.7 kg)   LMP 04/10/2012 (Exact Date)   BMI 52.61 kg/m²        Left knee exam    Gait: No use of assistive devices. No antalgic gait. Alignment: normal alignment. Inspection/skin: Skin is intact without erythema or ecchymosis. No gross deformity. Palpation: mild crepitus. Exquisitely tender along the medial joint line. Range of Motion: There is full range of motion. Strength: Normal quadriceps development. Effusion: No effusion or swelling present. Ligamentous stability: No cruciate or collateral ligament instability.      Neurologic and vascular: Skin is warm and well-perfused. Sensation is intact to light-touch. Special tests: Positive Sandee and positive deep flexion grind to the medial joint space. Diagnostics:  Radiology:       XR Left Knee Findings:     Views:  4 views L knee  Weight bearing: Yes   Findings: 4 views of the L knee taken in the office today and interpreted by me demonstrate no acute osseous abnormalities. Appropriate alignment noted. Well-maintained joint spaces noted to the medial, lateral, and patellofemoral joints. Appropriate patellar height and tilt. No fractures, dislocations, or radio-opaque foreign bodies noted. Previous comparison films: None    Impression:  No acute osseous abnormalities Left knee      Assessment: 62 y.o. female with left knee injury after trip 4 weeks ago, concern for medial meniscus tear    Plan: Pertinent imaging was reviewed. The etiology, natural history, and treatment options for the disorder were discussed. The roles of activity medication, antiinflammatories, injections, bracing, physical therapy, and surgical interventions were all described to the patient and questions were answered. The patient is exhibiting several signs of concern for an acute meniscus tear to the medial joint line. She has positive meniscal findings. She denies locking of the knee but she does have some instability sensation to the knee. Based on all these findings I will get the patient set up for an MRI as she has well-preserved joint spaces and I am concerned that a meniscus tear left untreated could progress arthritis in the joint. We will get an MRI and I will see her back after the results. I will also get the patient a wraparound brace to help with stability in the meantime. Soledad White is in agreement with this plan. All questions were answered to patient's satisfaction and was encouraged to call with any further questions.        Link Shannon DO  Orthopedic Surgery and Sports Medicine  3/2/2023    Orders Placed This Encounter   Procedures    XR KNEE LEFT (MIN 4 VIEWS)     Standing Status:   Future     Number of Occurrences:   1     Standing Expiration Date:   3/1/2024    MRI KNEE LEFT WO CONTRAST     Standing Status:   Future     Standing Expiration Date:   3/2/2024     Scheduling Instructions:      MRI left knee WO contrast      RO medial meniscus tear      Custom design benefits      [employer] [place]     Order Specific Question:   Reason for exam:     Answer:   RO medial meniscus tear     Order Specific Question:   What is the sedation requirement? Answer:   None    Breg Economy Hinged Knee WrapAround Brace     Patient was prescribed a [employer]reg [employer] Hinged Wrap Around Knee Brace. The left knee will require stabilization / immobilization from this semi-rigid / rigid orthosis to improve their function. The orthosis will assist in protecting the affected area, provide functional support and facilitate healing. The patient was educated and fit by a healthcare professional with expert knowledge and specialization in brace application while under the direct supervision of the treating physician. Verbal and written instructions for the use of and application of this item were provided. They were instructed to contact the office immediately should the brace result in increased pain, decreased sensation, increased swelling or worsening of the condition.

## 2023-03-08 DIAGNOSIS — F33.0 MILD EPISODE OF RECURRENT MAJOR DEPRESSIVE DISORDER (HCC): ICD-10-CM

## 2023-03-08 DIAGNOSIS — I10 ESSENTIAL HYPERTENSION: ICD-10-CM

## 2023-03-08 RX ORDER — VENLAFAXINE HYDROCHLORIDE 150 MG/1
CAPSULE, EXTENDED RELEASE ORAL
Qty: 30 CAPSULE | Refills: 3 | Status: SHIPPED | OUTPATIENT
Start: 2023-03-08

## 2023-03-08 RX ORDER — LISINOPRIL AND HYDROCHLOROTHIAZIDE 25; 20 MG/1; MG/1
TABLET ORAL
Qty: 30 TABLET | Refills: 3 | Status: SHIPPED | OUTPATIENT
Start: 2023-03-08

## 2023-07-05 DIAGNOSIS — I10 ESSENTIAL HYPERTENSION: ICD-10-CM

## 2023-07-05 DIAGNOSIS — F33.0 MILD EPISODE OF RECURRENT MAJOR DEPRESSIVE DISORDER (HCC): ICD-10-CM

## 2023-07-05 RX ORDER — VENLAFAXINE HYDROCHLORIDE 150 MG/1
CAPSULE, EXTENDED RELEASE ORAL
Qty: 30 CAPSULE | Refills: 2 | Status: SHIPPED | OUTPATIENT
Start: 2023-07-05

## 2023-07-05 RX ORDER — LISINOPRIL AND HYDROCHLOROTHIAZIDE 25; 20 MG/1; MG/1
TABLET ORAL
Qty: 30 TABLET | Refills: 2 | Status: SHIPPED | OUTPATIENT
Start: 2023-07-05

## 2023-07-13 DIAGNOSIS — F33.0 MILD EPISODE OF RECURRENT MAJOR DEPRESSIVE DISORDER (HCC): ICD-10-CM

## 2023-07-13 DIAGNOSIS — I10 ESSENTIAL HYPERTENSION: ICD-10-CM

## 2023-07-13 RX ORDER — VENLAFAXINE HYDROCHLORIDE 150 MG/1
CAPSULE, EXTENDED RELEASE ORAL
Qty: 30 CAPSULE | Refills: 2 | OUTPATIENT
Start: 2023-07-13

## 2023-07-13 RX ORDER — LISINOPRIL AND HYDROCHLOROTHIAZIDE 25; 20 MG/1; MG/1
TABLET ORAL
Qty: 30 TABLET | Refills: 2 | OUTPATIENT
Start: 2023-07-13

## 2023-08-23 DIAGNOSIS — R68.89 FLU-LIKE SYMPTOMS: Primary | ICD-10-CM

## 2023-08-23 DIAGNOSIS — Z11.52 ENCOUNTER FOR SCREENING FOR COVID-19: ICD-10-CM

## 2023-08-23 PROCEDURE — 87804 INFLUENZA ASSAY W/OPTIC: CPT | Performed by: NURSE PRACTITIONER

## 2023-08-23 PROCEDURE — 87635 SARS-COV-2 COVID-19 AMP PRB: CPT | Performed by: NURSE PRACTITIONER

## 2023-08-24 ENCOUNTER — TELEMEDICINE (OUTPATIENT)
Dept: FAMILY MEDICINE CLINIC | Age: 58
End: 2023-08-24

## 2023-08-24 DIAGNOSIS — U07.1 COVID-19 VIRUS INFECTION: Primary | ICD-10-CM

## 2023-08-24 DIAGNOSIS — J02.9 SORE THROAT: ICD-10-CM

## 2023-08-24 DIAGNOSIS — U07.1 COVID-19 VIRUS INFECTION: ICD-10-CM

## 2023-08-24 DIAGNOSIS — R68.89 FLU-LIKE SYMPTOMS: ICD-10-CM

## 2023-08-24 LAB
Lab: ABNORMAL
QC PASS/FAIL: ABNORMAL
S PYO AG THROAT QL: NORMAL
SARS-COV-2 RDRP RESP QL NAA+PROBE: POSITIVE

## 2023-08-24 SDOH — ECONOMIC STABILITY: HOUSING INSECURITY
IN THE LAST 12 MONTHS, WAS THERE A TIME WHEN YOU DID NOT HAVE A STEADY PLACE TO SLEEP OR SLEPT IN A SHELTER (INCLUDING NOW)?: NO

## 2023-08-24 SDOH — ECONOMIC STABILITY: INCOME INSECURITY: HOW HARD IS IT FOR YOU TO PAY FOR THE VERY BASICS LIKE FOOD, HOUSING, MEDICAL CARE, AND HEATING?: NOT HARD AT ALL

## 2023-08-24 SDOH — ECONOMIC STABILITY: TRANSPORTATION INSECURITY
IN THE PAST 12 MONTHS, HAS LACK OF TRANSPORTATION KEPT YOU FROM MEETINGS, WORK, OR FROM GETTING THINGS NEEDED FOR DAILY LIVING?: NO

## 2023-08-24 SDOH — ECONOMIC STABILITY: FOOD INSECURITY: WITHIN THE PAST 12 MONTHS, YOU WORRIED THAT YOUR FOOD WOULD RUN OUT BEFORE YOU GOT MONEY TO BUY MORE.: NEVER TRUE

## 2023-08-24 SDOH — ECONOMIC STABILITY: FOOD INSECURITY: WITHIN THE PAST 12 MONTHS, THE FOOD YOU BOUGHT JUST DIDN'T LAST AND YOU DIDN'T HAVE MONEY TO GET MORE.: NEVER TRUE

## 2023-08-24 ASSESSMENT — ENCOUNTER SYMPTOMS
ANAL BLEEDING: 0
SHORTNESS OF BREATH: 0
SORE THROAT: 1
VISUAL CHANGE: 0
SWOLLEN GLANDS: 0
COUGH: 1
EYES NEGATIVE: 1
BLOOD IN STOOL: 0
VOMITING: 0
CHANGE IN BOWEL HABIT: 0
NAUSEA: 1
ALLERGIC/IMMUNOLOGIC NEGATIVE: 1
ABDOMINAL PAIN: 0

## 2023-08-24 NOTE — TELEPHONE ENCOUNTER
[facility] does not have the Paxlovid. Please send to [address]. Called pharmacy and they hae it in stock.   Patient advised

## 2023-08-24 NOTE — PROGRESS NOTES
2023    TELEHEALTH EVALUATION -- Audio/Visual (During FVGYI-22 public health emergency)    HPI:    Betty Oneal (:  1965) has requested an audio/video evaluation for the following concern(s):  Chief Complaint   Patient presents with    Positive For Covid-19     23       Positive For Covid-19  This is a new problem. The current episode started in the past 7 days (4 days ago---started monday). The problem has been unchanged. Associated symptoms include anorexia, arthralgias, chills, congestion (nasal and chest), coughing (dry), fatigue, a fever, headaches, myalgias, nausea and a sore throat (itchy). Pertinent negatives include no abdominal pain, change in bowel habit, chest pain, diaphoresis, joint swelling, neck pain, numbness, rash, swollen glands, urinary symptoms, vertigo (yesterday only), visual change, vomiting or weakness. She has tried drinking, NSAIDs, acetaminophen and rest (mucinex and claritin) for the symptoms. The treatment provided moderate relief. Review of Systems   Constitutional:  Positive for chills, fatigue and fever. Negative for appetite change, diaphoresis and unexpected weight change. HENT:  Positive for congestion (nasal and chest) and sore throat (itchy). Eyes: Negative. Respiratory:  Positive for cough (dry). Negative for shortness of breath. Cardiovascular: Negative. Negative for chest pain, palpitations and leg swelling. Gastrointestinal:  Positive for anorexia and nausea. Negative for abdominal pain, anal bleeding, blood in stool, change in bowel habit and vomiting. Endocrine: Negative. Genitourinary: Negative. Negative for hematuria. Musculoskeletal:  Positive for arthralgias and myalgias. Negative for joint swelling and neck pain. Skin: Negative. Negative for rash. Allergic/Immunologic: Negative. Neurological:  Positive for headaches.  Negative for dizziness, vertigo (yesterday only), syncope, weakness, light-headedness and

## 2023-09-07 PROBLEM — N39.46 MIXED STRESS AND URGE URINARY INCONTINENCE: Status: ACTIVE | Noted: 2023-09-07

## 2023-09-07 PROBLEM — E78.1 HYPERTRIGLYCERIDEMIA: Status: ACTIVE | Noted: 2023-09-07

## 2023-10-09 DIAGNOSIS — F33.0 MILD EPISODE OF RECURRENT MAJOR DEPRESSIVE DISORDER (HCC): ICD-10-CM

## 2023-10-09 DIAGNOSIS — I10 ESSENTIAL HYPERTENSION: ICD-10-CM

## 2023-10-09 RX ORDER — LISINOPRIL AND HYDROCHLOROTHIAZIDE 25; 20 MG/1; MG/1
TABLET ORAL
Qty: 90 TABLET | Refills: 0 | Status: SHIPPED | OUTPATIENT
Start: 2023-10-09

## 2023-10-09 RX ORDER — VENLAFAXINE HYDROCHLORIDE 150 MG/1
CAPSULE, EXTENDED RELEASE ORAL
Qty: 90 CAPSULE | Refills: 0 | Status: SHIPPED | OUTPATIENT
Start: 2023-10-09

## 2023-10-09 NOTE — TELEPHONE ENCOUNTER
Refill Request     CONFIRM preferrred pharmacy with the patient. If Mail Order Rx - Pend for 90 day refill. Last Seen: Last Seen Department: 8/24/2023  Last Seen by PCP: 8/24/2023    Last Written: 7/5/23    If no future appointment scheduled, route STAFF MESSAGE with patient name to the [facility] for scheduling. Next Appointment:   No future appointments. Message sent to [address] [place] [address] to schedule appt with patient?   YES      Requested Prescriptions     Pending Prescriptions Disp Refills    venlafaxine (EFFEXOR XR) 150 MG extended release capsule [Pharmacy Med Name: VENLAFAXINE HCL  MG CAP] 90 capsule      Sig: TAKE 1 CAPSULE BY MOUTH DAILY    lisinopril-hydroCHLOROthiazide (PRINZIDE;ZESTORETIC) 20-25 MG per tablet [Pharmacy Med Name: LISINOPRIL-HCTZ 20-25 MG TAB] 90 tablet      Sig: TAKE 1 TABLET BY MOUTH DAILY

## 2023-10-12 ENCOUNTER — HOSPITAL ENCOUNTER (EMERGENCY)
Age: 58
Discharge: HOME | End: 2023-10-12
Attending: STUDENT IN AN ORGANIZED HEALTH CARE EDUCATION/TRAINING PROGRAM

## 2023-10-12 ENCOUNTER — APPOINTMENT (OUTPATIENT)
Dept: GENERAL RADIOLOGY | Age: 58
End: 2023-10-12

## 2023-10-12 VITALS
HEART RATE: 89 BPM | BODY MASS INDEX: 49.96 KG/M2 | OXYGEN SATURATION: 98 % | SYSTOLIC BLOOD PRESSURE: 159 MMHG | DIASTOLIC BLOOD PRESSURE: 89 MMHG | WEIGHT: 282 LBS | TEMPERATURE: 98.4 F | HEIGHT: 63 IN | RESPIRATION RATE: 14 BRPM

## 2023-10-12 DIAGNOSIS — S63.259A DISLOCATION OF FINGER, INITIAL ENCOUNTER: ICD-10-CM

## 2023-10-12 DIAGNOSIS — S61.411A LACERATION OF RIGHT HAND, FOREIGN BODY PRESENCE UNSPECIFIED, INITIAL ENCOUNTER: Primary | ICD-10-CM

## 2023-10-12 PROCEDURE — 90471 IMMUNIZATION ADMIN: CPT | Performed by: STUDENT IN AN ORGANIZED HEALTH CARE EDUCATION/TRAINING PROGRAM

## 2023-10-12 PROCEDURE — 6360000002 HC RX W HCPCS: Performed by: STUDENT IN AN ORGANIZED HEALTH CARE EDUCATION/TRAINING PROGRAM

## 2023-10-12 PROCEDURE — 73120 X-RAY EXAM OF HAND: CPT

## 2023-10-12 PROCEDURE — 73140 X-RAY EXAM OF FINGER(S): CPT

## 2023-10-12 PROCEDURE — 90715 TDAP VACCINE 7 YRS/> IM: CPT | Performed by: STUDENT IN AN ORGANIZED HEALTH CARE EDUCATION/TRAINING PROGRAM

## 2023-10-12 PROCEDURE — 99284 EMERGENCY DEPT VISIT MOD MDM: CPT

## 2023-10-12 RX ADMIN — TETANUS TOXOID, REDUCED DIPHTHERIA TOXOID AND ACELLULAR PERTUSSIS VACCINE, ADSORBED 0.5 ML: 5; 2.5; 8; 8; 2.5 SUSPENSION INTRAMUSCULAR at 19:54

## 2023-10-12 ASSESSMENT — PAIN DESCRIPTION - ONSET: ONSET: SUDDEN

## 2023-10-12 ASSESSMENT — PATIENT HEALTH QUESTIONNAIRE - PHQ9
SUM OF ALL RESPONSES TO PHQ QUESTIONS 1-9: 0
1. LITTLE INTEREST OR PLEASURE IN DOING THINGS: 0
SUM OF ALL RESPONSES TO PHQ QUESTIONS 1-9: 0
2. FEELING DOWN, DEPRESSED OR HOPELESS: 0
SUM OF ALL RESPONSES TO PHQ9 QUESTIONS 1 & 2: 0

## 2023-10-12 ASSESSMENT — LIFESTYLE VARIABLES
HOW OFTEN DO YOU HAVE A DRINK CONTAINING ALCOHOL: NEVER
HOW MANY STANDARD DRINKS CONTAINING ALCOHOL DO YOU HAVE ON A TYPICAL DAY: PATIENT DOES NOT DRINK

## 2023-10-12 ASSESSMENT — PAIN DESCRIPTION - DESCRIPTORS: DESCRIPTORS: THROBBING

## 2023-10-12 ASSESSMENT — PAIN DESCRIPTION - PAIN TYPE: TYPE: ACUTE PAIN

## 2023-10-12 ASSESSMENT — PAIN SCALES - GENERAL: PAINLEVEL_OUTOF10: 4

## 2023-10-12 ASSESSMENT — PAIN - FUNCTIONAL ASSESSMENT
PAIN_FUNCTIONAL_ASSESSMENT: 0-10
PAIN_FUNCTIONAL_ASSESSMENT: ACTIVITIES ARE NOT PREVENTED

## 2023-10-12 ASSESSMENT — PAIN DESCRIPTION - FREQUENCY: FREQUENCY: CONTINUOUS

## 2023-10-12 ASSESSMENT — PAIN DESCRIPTION - ORIENTATION: ORIENTATION: RIGHT

## 2023-10-12 ASSESSMENT — PAIN DESCRIPTION - LOCATION: LOCATION: FINGER (COMMENT WHICH ONE)

## 2023-10-12 NOTE — ED NOTES
Reports fall while weed eating today. Right hand fifth finger +deformity. Small abrasion to the 5th digit with larger abrasion to the palm of the hand; wounds cleaned at triage. Ice applied.       Monse German RN  10/12/23 1436

## 2023-10-13 NOTE — DISCHARGE INSTRUCTIONS
You were evaluated in the emergency department for finger injury and cut to hand. Assessments and testing completed during your visit were reassuring and at this time there is no indication for further testing, treatment or admission to the hospital. Given this it is appropriate to discharge you from the emergency department. At the time of discharge we discussed the following:    Please wash the cut twice daily and keep clean until fully healed. Please maintain the finger in the splint for comfort and visit with the hand specialist in the next 7-10 days for repeat evaluation of the finger injury     Please note that sometimes it is difficult to diagnose a medical condition early in the disease process before the disease is fully manifest. Because of this, should you develop any new or worsening symptoms, you may return at any time to the emergency department for another evaluation. If available you are also recommended to review this visit with your primary care physician or other medical provider in the next 7 days. Thank you for allowing us to care for you today.

## 2023-10-13 NOTE — ED PROVIDER NOTES
[facility] Name: Andre Vu   MRN: [id number]   [address] [facility] 1965   Date of evaluation: 10/12/2023   Provider: Dave Vallejo MD   PCP: ULISES Polk CNP   Note Started: 8:40 PM EDT 10/12/23       Chief Complaint     Finger Injury      History of Present Illness     Andre Vu is a 62 y.o. female who presents with right fifth digit injury. There is no major contributing past medical history. The patient was working in her garden today when she tripped having a mechanical fall onto her right hand sustaining a small wound to the palmar right fifth digit. And no other apparent injuries and per plan health. I have reviewed the nursing notes and agree unless otherwise noted. Review of Systems     Positives and pertinent negatives as per HPI. Past Medical, Surgical, Family, and Social History     She has a past medical history of Back pain, Gastroesophageal reflux disease without esophagitis, Hypertension, Ingrown toenail, Patella fracture, and Plantar fasciitis. She has a past surgical history that includes  section and other surgical history (10/18/2021). Her family history includes Diabetes in her mother; High Blood Pressure in her father, mother, and sister. She reports that she has been smoking cigarettes. She has a 10.00 pack-year smoking history. She has never used smokeless tobacco. She reports that she does not drink alcohol and does not use drugs.     SCREENINGS:          Maribeth Coma Scale  Eye Opening: Spontaneous  Best Verbal Response: Oriented  Best Motor Response: Obeys commands  [place]ta Coma Scale Score: 15                        CIWA Assessment  BP: (!) 159/89  Pulse: 89               Medications     Discharge Medication List as of 10/12/2023  8:39 PM        CONTINUE these medications which have NOT CHANGED    Details   venlafaxine (EFFEXOR XR) 150 MG extended release capsule TAKE

## 2023-10-24 ENCOUNTER — OFFICE VISIT (OUTPATIENT)
Dept: FAMILY MEDICINE CLINIC | Age: 58
End: 2023-10-24
Payer: COMMERCIAL

## 2023-10-24 VITALS
OXYGEN SATURATION: 96 % | WEIGHT: 285 LBS | SYSTOLIC BLOOD PRESSURE: 110 MMHG | BODY MASS INDEX: 50.5 KG/M2 | HEIGHT: 63 IN | HEART RATE: 82 BPM | DIASTOLIC BLOOD PRESSURE: 54 MMHG

## 2023-10-24 DIAGNOSIS — S63.259D DISLOCATION OF FINGER, SUBSEQUENT ENCOUNTER: ICD-10-CM

## 2023-10-24 DIAGNOSIS — S62.619D CLOSED AVULSION FRACTURE OF PROXIMAL PHALANX OF FINGER WITH ROUTINE HEALING, SUBSEQUENT ENCOUNTER: ICD-10-CM

## 2023-10-24 DIAGNOSIS — Z09 ENCOUNTER FOR EXAMINATION FOLLOWING TREATMENT AT HOSPITAL: Primary | ICD-10-CM

## 2023-10-24 DIAGNOSIS — L03.119 CELLULITIS OF HAND: ICD-10-CM

## 2023-10-24 PROCEDURE — 3074F SYST BP LT 130 MM HG: CPT | Performed by: NURSE PRACTITIONER

## 2023-10-24 PROCEDURE — 3078F DIAST BP <80 MM HG: CPT | Performed by: NURSE PRACTITIONER

## 2023-10-24 PROCEDURE — 99214 OFFICE O/P EST MOD 30 MIN: CPT | Performed by: NURSE PRACTITIONER

## 2023-10-24 RX ORDER — CEPHALEXIN 500 MG/1
500 CAPSULE ORAL 3 TIMES DAILY
Qty: 21 CAPSULE | Refills: 0 | Status: SHIPPED | OUTPATIENT
Start: 2023-10-24 | End: 2023-10-31

## 2023-10-24 NOTE — PROGRESS NOTES
Lamar Fiore MD, Orthopedic Surgery (Hand, Wrist), [facility]    Cellulitis of hand  -     cephALEXin (KEFLEX) 500 MG capsule; Take 1 capsule by mouth 3 times daily for 7 days  -     Lamar Fiore MD, Orthopedic Surgery (Hand, Wrist), [facility]      Prior to Visit Medications    Medication Sig Taking? Authorizing Provider   cephALEXin (KEFLEX) 500 MG capsule Take 1 capsule by mouth 3 times daily for 7 days Yes ULISES Lane CNP   venlafaxine (EFFEXOR XR) 150 MG extended release capsule TAKE 1 CAPSULE BY MOUTH DAILY Yes Carissa Elena APRN - CNP   lisinopril-hydroCHLOROthiazide (PRINZIDE;ZESTORETIC) 20-25 MG per tablet TAKE 1 TABLET BY MOUTH DAILY Yes Carissa Elena APRN - CNP   busPIRone (BUSPAR) 10 MG tablet TAKE ONE TABLET BY MOUTH THREE TIMES A DAY AS NEEDED FOR ANXIETY AND STRESS Yes Carissa Elena APRN - CNP   Cholecalciferol (VITAMIN D3) 1.25 MG (63464 UT) CAPS TAKE 1 CAPSULE BY MOUTH ONCE WEEKLY Yes Carissa Elena APRN - CNP     Orders Placed This Encounter   Medications    cephALEXin (KEFLEX) 500 MG capsule     Sig: Take 1 capsule by mouth 3 times daily for 7 days     Dispense:  21 capsule     Refill:  0         No follow-ups on file. Patient should call the office immediately with new or ongoing signs or symptoms or worsening, or proceedto the emergency room. No changes in past medical history, past surgical history, social history, or family history were noted during the patient encounter unless specifically listed above. All updates of past medicalhistory, past surgical history, social history, or family history were reviewed personally by me during the office visit. All problems listed in the assessment are stable unless noted otherwise. Medication profilereviewed personally by me during the office visit. Medication side effects and possible impairments from medications were discussed as applicable.      Call if pattern of

## 2023-10-25 ENCOUNTER — TELEPHONE (OUTPATIENT)
Dept: FAMILY MEDICINE CLINIC | Age: 58
End: 2023-10-25

## 2023-10-25 ASSESSMENT — ENCOUNTER SYMPTOMS
COLOR CHANGE: 1
RESPIRATORY NEGATIVE: 1
BACK PAIN: 0

## 2023-10-25 NOTE — TELEPHONE ENCOUNTER
Please call patient - she has an appt with orthopedic Dr. Concepción Muñiz. He is not a hand specialist.  I would actually recommend her seeing a hand specialist.  Referral placed. Please call and see if she can get in to see Dr. Annika Abreu today or tomorrow for her finger dislocation and fracture.

## 2023-10-26 ENCOUNTER — TELEPHONE (OUTPATIENT)
Dept: ORTHOPEDIC SURGERY | Age: 58
End: 2023-10-26

## 2023-10-26 NOTE — TELEPHONE ENCOUNTER
Spoke with the patient. I let her know that she has been referred to Dr. Yoana Ley by the ED and her PCP. Scheduled her to see Dr. Yoana Ley Monday at 130 at the [facility] office. Patient was aware of the location and address was given.

## 2023-10-26 NOTE — TELEPHONE ENCOUNTER
Called Dr. Radha Bauer office. Spoke with Dhaval. She took an urgent message to see if they could fit the patient in the schedule today or tomorrow. Advised patient as well.   Number given to call office with the appt and time

## 2023-10-26 NOTE — TELEPHONE ENCOUNTER
Appointment Request     Patient requesting earlier appointment: Yes  Appointment offered to patient: YES   Patient Contact Number: [phone]    R HAND/ FINGER DISLOCATED

## 2023-10-30 ENCOUNTER — OFFICE VISIT (OUTPATIENT)
Dept: ORTHOPEDIC SURGERY | Age: 58
End: 2023-10-30
Payer: COMMERCIAL

## 2023-10-30 VITALS — BODY MASS INDEX: 50.5 KG/M2 | WEIGHT: 285 LBS | HEIGHT: 63 IN

## 2023-10-30 DIAGNOSIS — M79.641 RIGHT HAND PAIN: Primary | ICD-10-CM

## 2023-10-30 PROCEDURE — 99203 OFFICE O/P NEW LOW 30 MIN: CPT | Performed by: STUDENT IN AN ORGANIZED HEALTH CARE EDUCATION/TRAINING PROGRAM

## 2023-10-31 NOTE — PROGRESS NOTES
Hand, Upper Extremity and Reconstructive Surgery                Patti Angel MD                                           Summary of Upper Extremity Problems and Interventions     Diagnosis: Right small finger PIP dislocation  Referral: Carla Parks    History of Present Illness     Dipika Sosa is a 62 y.o. left hand dominant female who presents with right small finger PIP dislocation. She sustained an injury on 10/12/23 when she fell and hit her hand on the side of a garden bed. She went to the ED where she was found to have a dorsal dislocation of the small finger PIP joint. The dislocation was reduced and she was initially provided with buddy straps. The patient felt that the buddy straps cause pain so she wore a AlumaFoam splint across the length of her small finger instead. She reports continued pain and some stiffness of the small finger.      Occupation: LNP    Allergies   No Known Allergies    Home Medications     Current Outpatient Medications   Medication Instructions    busPIRone (BUSPAR) 10 MG tablet TAKE ONE TABLET BY MOUTH THREE TIMES A DAY AS NEEDED FOR ANXIETY AND STRESS    cephALEXin (KEFLEX) 500 mg, Oral, 3 TIMES DAILY    Cholecalciferol (VITAMIN D3) 1.25 MG (45016 UT) CAPS TAKE 1 CAPSULE BY MOUTH ONCE WEEKLY    lisinopril-hydroCHLOROthiazide (PRINZIDE;ZESTORETIC) 20-25 MG per tablet TAKE 1 TABLET BY MOUTH DAILY    venlafaxine (EFFEXOR XR) 150 MG extended release capsule TAKE 1 CAPSULE BY MOUTH DAILY       Past Medical History     Past Medical History:   Diagnosis Date    Back pain     Gastroesophageal reflux disease without esophagitis 2019    Hypertension     Ingrown toenail 2021    Patella fracture 2015    Plantar fasciitis        Past Surgical History     Past Surgical History:   Procedure Laterality Date     SECTION      OTHER SURGICAL HISTORY  10/18/2021    Matrixectomy       Social History     Social History

## 2023-11-13 ENCOUNTER — OFFICE VISIT (OUTPATIENT)
Dept: ORTHOPEDIC SURGERY | Age: 58
End: 2023-11-13
Payer: COMMERCIAL

## 2023-11-13 VITALS — BODY MASS INDEX: 50.5 KG/M2 | WEIGHT: 285 LBS | HEIGHT: 63 IN

## 2023-11-13 DIAGNOSIS — M79.644 FINGER PAIN, RIGHT: Primary | ICD-10-CM

## 2023-11-13 PROCEDURE — 99212 OFFICE O/P EST SF 10 MIN: CPT | Performed by: STUDENT IN AN ORGANIZED HEALTH CARE EDUCATION/TRAINING PROGRAM

## 2023-11-13 NOTE — PROGRESS NOTES
Hand, Upper Extremity and Reconstructive Surgery                Luis Hernandez MD                                           Summary of Upper Extremity Problems and Interventions     Diagnosis: Right small finger PIP dislocation  Referral: Ratna Gabriel    History of Present Illness     Zita Tenorio is a 62 y.o. left hand dominant female who presents for follow-up of the above injury. She has been wearing her buddy straps since last visit. She reports improvement of her range of motion but not complete normal range of motion at this time. She is working on flexing her small finger PIP and DIP joint at home. She reports some persistent pain about the PIP joint the persistent swelling. Denies recurrent dislocation of her PIP joint.     Allergies   No Known Allergies    Home Medications     Current Outpatient Medications   Medication Instructions    busPIRone (BUSPAR) 10 MG tablet TAKE ONE TABLET BY MOUTH THREE TIMES A DAY AS NEEDED FOR ANXIETY AND STRESS    Cholecalciferol (VITAMIN D3) 1.25 MG (50560 UT) CAPS TAKE 1 CAPSULE BY MOUTH ONCE WEEKLY    lisinopril-hydroCHLOROthiazide (PRINZIDE;ZESTORETIC) 20-25 MG per tablet TAKE 1 TABLET BY MOUTH DAILY    venlafaxine (EFFEXOR XR) 150 MG extended release capsule TAKE 1 CAPSULE BY MOUTH DAILY       Past Medical History     Past Medical History:   Diagnosis Date    Back pain     Gastroesophageal reflux disease without esophagitis 2019    Hypertension     Ingrown toenail 2021    Patella fracture 2015    Plantar fasciitis        Past Surgical History     Past Surgical History:   Procedure Laterality Date     SECTION      OTHER SURGICAL HISTORY  10/18/2021    Matrixectomy       Social History     Social History     Occupational History    Not on file   Tobacco Use    Smoking status: Every Day     Packs/day: 0.50     Years: 20.00     Additional pack years: 0.00     Total pack years: 10.00     Types: Cigarettes [facility] pharmacy checking on a status update.

## 2023-11-15 ENCOUNTER — HOSPITAL ENCOUNTER (OUTPATIENT)
Dept: OCCUPATIONAL THERAPY | Age: 58
Setting detail: THERAPIES SERIES
Discharge: HOME | End: 2023-11-15
Payer: COMMERCIAL

## 2023-11-15 PROCEDURE — 97022 WHIRLPOOL THERAPY: CPT | Performed by: OCCUPATIONAL THERAPIST

## 2023-11-15 PROCEDURE — 97112 NEUROMUSCULAR REEDUCATION: CPT | Performed by: OCCUPATIONAL THERAPIST

## 2023-11-15 PROCEDURE — 97165 OT EVAL LOW COMPLEX 30 MIN: CPT | Performed by: OCCUPATIONAL THERAPIST

## 2023-11-15 NOTE — PLAN OF CARE
Falls Risk assessed and no intervention required. Time Up and Go (TUG):   Not Assessed     ASSESSMENT   Assessment:   Ahsan Hernandez is a 62 y.o. female presenting today to Outpatient PT with signs and symptoms consistent with small finger dislocation. Patient presents with the functional impairments and activity limitations listed below and would benefit from continued Outpatient PT to address the below impairments as well as improve pain, and restore function. Functional Impairments:   Decreased UE functional ROM    Functional Activity Limitations (from functional questionnaire and intake): Any of your usual work, housework, or school activities    Participation Restrictions:   Reduced participation in home management   Reduced participation in sports/recreation    Classification :   Signs/symptoms consistent with post-surgical status including decreased ROM, strength and function. Barriers to/and or personal factors that will affect rehab potential:   None noted    Prognosis/Rehab Potential:  [] Excellent     [x] Good     [] Fair     [] Poor         Tolerance of evaluation/treatment:   [] Excellent     [x] Good     [] Fair     [] Poor    Physical Therapy Evaluation Complexity Justification  [x] A history of present problem and no personal factors and/or co-morbidities that impact the plan of care  [x] A total of 1-2 elements  found upon examination of body systems using standardized tests and measures addressing any of the following: body structures, functions (impairments), activity limitations, and/or participation restrictions  [x] A clinical presentation with stable and/or uncomplicated characteristics   [x] Clinical decision making of LOW (63551 - Typically 20 minutes face-to-face) complexity using standardized patient assessment instrument and/or measurable assessment of functional outcome.     GOALS     Patient stated goal: use hand without pain  Status: [] Progressing: [] Met: [] Not Met:

## 2023-11-17 NOTE — FLOWSHEET NOTE
[address] [place] [address] and [facility], [address], [address] office: [phone] fax: [phone]      Physical Therapy: TREATMENT/PROGRESS NOTE   Patient: Madhav Elise (12 y.o. female)   Treatment Date: 11/15/2023   :  1965 MRN: [id number]   Visit #: Visit count could not be calculated. Make sure you are using a visit which is associated with an episode. Insurance Allowable Auth Needed   45 []Yes    []No    Insurance: Payor: [employer] / Plan: [employer] / Product Type: *No Product type* /   Insurance ID: [id number] - (Commercial)  Secondary Insurance (if applicable):    Treatment Diagnosis: Finger pain, right [M79.644]  No diagnosis found.    Medical Diagnosis:    Finger pain, right [R93.010]   Referring Physician: Micha Guevara MD  PCP: ULISES Barrett CNP                             Plan of care signed (Y/N):     Date of Patient follow up with Physician:      Progress Report/POC: EVAL today  POC update due: (10 visits /OR [address], whichever is less)    12/15/2023           Preferred Language for Healthcare:   [x]English       []other:    SUBJECTIVE EXAMINATION     Patient Report/Comments: see eval     Test used Initial score  11/17/23 11/15/2023   Pain Summary VAS 0-7/10    Functional questionnaire Quick DASH 14    Other:                OBJECTIVE EXAMINATION     OBJECTIVE:   Date:  Hand Dominance:     []  Right    [x] Left 11/15/2023     Somewhat ambidextrous      Objective Measures:     PAIN 0-7/10   DASH 14%   Digit  ROM         Index       WFL                              Long WFL                              Ring WFL                              Small MP:88  PIP:77  DIP:40   Digits tip to DPFC in cm Index:  Long:  Ring:  Small:   Thumb ROM MP  IP   Thumb opposition  R:  L:   Thumb Radial/Palmar abd ROM R:  L:   Wrist ROM Ext/Flex WFL   Rad/Uln dev ROM R:  L:   Forearm ROM  Sup/pron

## 2023-11-22 ENCOUNTER — HOSPITAL ENCOUNTER (OUTPATIENT)
Dept: OCCUPATIONAL THERAPY | Age: 58
Setting detail: THERAPIES SERIES
End: 2023-11-22
Payer: COMMERCIAL

## 2023-11-22 NOTE — FLOWSHEET NOTE
[address] [place] [address] Drive  Phone: [phone] Fax: [phone]       Occupational Therapy  Cancellation/No-show Note  Patient Name:  Tiburcio Holly   :  1965   Date:  2023  Cancelled visits to date: 1  No-shows to date: 0    For today's appointment patient:  [x]    Cancelled  []    Rescheduled appointment  []    No-show     Reason given by patient:  [x]    Patient ill  []    Conflicting appointment  []    No transportation    []    Conflict with work  []    No reason given  []    Other:     Comments:      Electronically signed by:  Carlos Zamora OTR/L [id number]

## 2024-01-11 DIAGNOSIS — I10 ESSENTIAL HYPERTENSION: ICD-10-CM

## 2024-01-11 DIAGNOSIS — F33.0 MILD EPISODE OF RECURRENT MAJOR DEPRESSIVE DISORDER (HCC): ICD-10-CM

## 2024-01-11 RX ORDER — LISINOPRIL AND HYDROCHLOROTHIAZIDE 25; 20 MG/1; MG/1
TABLET ORAL
Qty: 90 TABLET | Refills: 0 | Status: SHIPPED | OUTPATIENT
Start: 2024-01-11

## 2024-01-11 RX ORDER — VENLAFAXINE HYDROCHLORIDE 150 MG/1
CAPSULE, EXTENDED RELEASE ORAL
Qty: 90 CAPSULE | Refills: 0 | Status: SHIPPED | OUTPATIENT
Start: 2024-01-11

## 2024-01-11 NOTE — TELEPHONE ENCOUNTER
Pt scheduled for 3/4/24. She needed a Monday due to her schedule and that was your first Monday available. Please send refills

## 2024-01-11 NOTE — TELEPHONE ENCOUNTER
Patient has not been seen for chronic conditions since October 2022.  Please call patient and schedule a non- same day appointment with me when available

## 2024-01-11 NOTE — TELEPHONE ENCOUNTER
Refill Request     CONFIRM preferrred pharmacy with the patient.    If Mail Order Rx - Pend for 90 day refill.      Last Seen: Last Seen Department: 10/24/2023  Last Seen by PCP: 8/24/2023    Last Written: 10/9/23    If no future appointment scheduled, route STAFF MESSAGE with patient name to the  Pool for scheduling.      Next Appointment:   No future appointments.    Message sent to  to schedule appt with patient?  N/A      Requested Prescriptions     Pending Prescriptions Disp Refills    lisinopril-hydroCHLOROthiazide (PRINZIDE;ZESTORETIC) 20-25 MG per tablet [Pharmacy Med Name: LISINOPRIL-HCTZ 20-25 MG TAB] 90 tablet 0     Sig: TAKE 1 TABLET BY MOUTH DAILY    venlafaxine (EFFEXOR XR) 150 MG extended release capsule [Pharmacy Med Name: VENLAFAXINE HCL  MG CAP] 90 capsule 0     Sig: TAKE 1 CAPSULE BY MOUTH DAILY

## 2024-03-04 ENCOUNTER — HOSPITAL ENCOUNTER (OUTPATIENT)
Dept: GENERAL RADIOLOGY | Age: 59
Discharge: HOME | End: 2024-03-04
Payer: COMMERCIAL

## 2024-03-04 ENCOUNTER — HOSPITAL ENCOUNTER (OUTPATIENT)
Age: 59
Discharge: HOME | End: 2024-03-04
Payer: COMMERCIAL

## 2024-03-04 ENCOUNTER — OFFICE VISIT (OUTPATIENT)
Dept: FAMILY MEDICINE CLINIC | Age: 59
End: 2024-03-04
Payer: COMMERCIAL

## 2024-03-04 VITALS
OXYGEN SATURATION: 96 % | WEIGHT: 274 LBS | SYSTOLIC BLOOD PRESSURE: 126 MMHG | HEART RATE: 54 BPM | HEIGHT: 63 IN | BODY MASS INDEX: 48.55 KG/M2 | DIASTOLIC BLOOD PRESSURE: 74 MMHG

## 2024-03-04 DIAGNOSIS — E55.9 VITAMIN D DEFICIENCY: ICD-10-CM

## 2024-03-04 DIAGNOSIS — I10 ESSENTIAL HYPERTENSION: ICD-10-CM

## 2024-03-04 DIAGNOSIS — R05.8 COUGH PRESENT FOR GREATER THAN 3 WEEKS: ICD-10-CM

## 2024-03-04 DIAGNOSIS — F33.0 MILD EPISODE OF RECURRENT MAJOR DEPRESSIVE DISORDER (HCC): ICD-10-CM

## 2024-03-04 DIAGNOSIS — R05.8 COUGH PRESENT FOR GREATER THAN 3 WEEKS: Primary | ICD-10-CM

## 2024-03-04 DIAGNOSIS — Z13.220 SCREENING FOR CHOLESTEROL LEVEL: ICD-10-CM

## 2024-03-04 PROCEDURE — 3078F DIAST BP <80 MM HG: CPT | Performed by: NURSE PRACTITIONER

## 2024-03-04 PROCEDURE — 3074F SYST BP LT 130 MM HG: CPT | Performed by: NURSE PRACTITIONER

## 2024-03-04 PROCEDURE — 71046 X-RAY EXAM CHEST 2 VIEWS: CPT

## 2024-03-04 PROCEDURE — 99214 OFFICE O/P EST MOD 30 MIN: CPT | Performed by: NURSE PRACTITIONER

## 2024-03-04 RX ORDER — VENLAFAXINE HYDROCHLORIDE 150 MG/1
150 CAPSULE, EXTENDED RELEASE ORAL DAILY
Qty: 90 CAPSULE | Refills: 1 | Status: SHIPPED | OUTPATIENT
Start: 2024-03-04

## 2024-03-04 RX ORDER — METHYLPREDNISOLONE 4 MG/1
TABLET ORAL
Qty: 1 KIT | Refills: 0 | Status: SHIPPED | OUTPATIENT
Start: 2024-03-04 | End: 2024-03-10

## 2024-03-04 RX ORDER — LISINOPRIL AND HYDROCHLOROTHIAZIDE 25; 20 MG/1; MG/1
1 TABLET ORAL DAILY
Qty: 90 TABLET | Refills: 1 | Status: SHIPPED | OUTPATIENT
Start: 2024-03-04

## 2024-03-04 RX ORDER — CHOLECALCIFEROL (VITAMIN D3) 1250 MCG
CAPSULE ORAL
Qty: 4 CAPSULE | Refills: 5 | Status: SHIPPED | OUTPATIENT
Start: 2024-03-04

## 2024-03-04 ASSESSMENT — PATIENT HEALTH QUESTIONNAIRE - PHQ9
SUM OF ALL RESPONSES TO PHQ QUESTIONS 1-9: 1
8. MOVING OR SPEAKING SO SLOWLY THAT OTHER PEOPLE COULD HAVE NOTICED. OR THE OPPOSITE, BEING SO FIGETY OR RESTLESS THAT YOU HAVE BEEN MOVING AROUND A LOT MORE THAN USUAL: 0
SUM OF ALL RESPONSES TO PHQ9 QUESTIONS 1 & 2: 0
SUM OF ALL RESPONSES TO PHQ QUESTIONS 1-9: 1
SUM OF ALL RESPONSES TO PHQ QUESTIONS 1-9: 1
9. THOUGHTS THAT YOU WOULD BE BETTER OFF DEAD, OR OF HURTING YOURSELF: 0
1. LITTLE INTEREST OR PLEASURE IN DOING THINGS: 0
10. IF YOU CHECKED OFF ANY PROBLEMS, HOW DIFFICULT HAVE THESE PROBLEMS MADE IT FOR YOU TO DO YOUR WORK, TAKE CARE OF THINGS AT HOME, OR GET ALONG WITH OTHER PEOPLE: 0
2. FEELING DOWN, DEPRESSED OR HOPELESS: 0
4. FEELING TIRED OR HAVING LITTLE ENERGY: 0
5. POOR APPETITE OR OVEREATING: 1
7. TROUBLE CONCENTRATING ON THINGS, SUCH AS READING THE NEWSPAPER OR WATCHING TELEVISION: 0
3. TROUBLE FALLING OR STAYING ASLEEP: 0
SUM OF ALL RESPONSES TO PHQ QUESTIONS 1-9: 1
6. FEELING BAD ABOUT YOURSELF - OR THAT YOU ARE A FAILURE OR HAVE LET YOURSELF OR YOUR FAMILY DOWN: 0

## 2024-03-04 ASSESSMENT — ENCOUNTER SYMPTOMS
ALLERGIC/IMMUNOLOGIC NEGATIVE: 1
COUGH: 1
ABDOMINAL PAIN: 0
WHEEZING: 1
GASTROINTESTINAL NEGATIVE: 1
BLOOD IN STOOL: 0
ANAL BLEEDING: 0
NAUSEA: 0
EYES NEGATIVE: 1
SHORTNESS OF BREATH: 1

## 2024-03-04 ASSESSMENT — ANXIETY QUESTIONNAIRES
5. BEING SO RESTLESS THAT IT IS HARD TO SIT STILL: 0
6. BECOMING EASILY ANNOYED OR IRRITABLE: 1
IF YOU CHECKED OFF ANY PROBLEMS ON THIS QUESTIONNAIRE, HOW DIFFICULT HAVE THESE PROBLEMS MADE IT FOR YOU TO DO YOUR WORK, TAKE CARE OF THINGS AT HOME, OR GET ALONG WITH OTHER PEOPLE: NOT DIFFICULT AT ALL
7. FEELING AFRAID AS IF SOMETHING AWFUL MIGHT HAPPEN: 0
1. FEELING NERVOUS, ANXIOUS, OR ON EDGE: 1
2. NOT BEING ABLE TO STOP OR CONTROL WORRYING: 3
4. TROUBLE RELAXING: 1
GAD7 TOTAL SCORE: 9
3. WORRYING TOO MUCH ABOUT DIFFERENT THINGS: 3

## 2024-03-04 NOTE — PATIENT INSTRUCTIONS
Not feeling your best?  Where to go for the right care at the right time.    Dear Roya Barton   I wanted to provide you with some information that might help you seek care for your condition when your primary care provider or specialist is unavailable. If you have a need outside of normal business hours, you should first contact your primary care office or specialist caring for your condition. They may have on-call providers that could assist with your care. During office hours, you may request a virtual or same day appointment.   But what if your primary care provider is not in the office that day and you can't wait until the  next day for care? In that situation, your next option is to visit an urgent care facility.          [facility] now has urgent care sites open to support our community.   [facility] is a great alternative when you need immediate medical care that is not a serious threat to your health or your doctor's office is closed or unable to get you in for an appointment. The urgent care centers offer fast access to [facility] doctors for minor illnesses and injuries for patients of all ages. There are other medical services available including lab testing, X-rays, EKGs, and IV fluids.  Locations are open daily from 8 a.m. - 8 p.m.     [facility]  [address], [place], [place] [address]  [phone]    [address], [place], [place] [address]  [phone]    Local Urgent Care     [facility] 8:30 am - 7:70 pm   [address] [place] [address]  [phone]    [facility]     8 am - 8 pm   [address] [place] [address]  [phone]    [facility] 7 am - 7:30 pm  [address] [place] [address]  [phone]     [facility] 7 am - 7:30 pm  [address] [place] [address]  [phone]    Norberto

## 2024-03-04 NOTE — PROGRESS NOTES
immediately with new symptoms, change in symptoms or worseningof symptoms. If this is not feasible, patient is instructed to report to the emergency room. Medication profile reviewed. Medication side effects and possible impairments from medications were discussed as applicable.Allergies were reviewed. Health maintenance was reviewed and updated as appropriate.     Return in about 6 months (around 9/4/2024) for prison.    (Comment: Please note this report has been produced using a combination of typing and speech recognition software and may contain errors related to that system including errors in grammar, punctuation, and spelling, as well as words and phrases that may be inappropriate. If there are any questions or concerns please feel free to contact the dictating provider for clarification.)

## 2024-03-06 ENCOUNTER — NURSE ONLY (OUTPATIENT)
Dept: FAMILY MEDICINE CLINIC | Age: 59
End: 2024-03-06
Payer: COMMERCIAL

## 2024-03-06 DIAGNOSIS — Z13.220 SCREENING FOR CHOLESTEROL LEVEL: ICD-10-CM

## 2024-03-06 DIAGNOSIS — E55.9 VITAMIN D DEFICIENCY: ICD-10-CM

## 2024-03-06 DIAGNOSIS — I10 ESSENTIAL HYPERTENSION: ICD-10-CM

## 2024-03-06 LAB
25(OH)D3 SERPL-MCNC: 26.7 NG/ML
ALBUMIN SERPL-MCNC: 4.4 G/DL (ref 3.4–5)
ALBUMIN/GLOB SERPL: 1.3 {RATIO} (ref 1.1–2.2)
ALP SERPL-CCNC: 81 U/L (ref 40–129)
ALT SERPL-CCNC: 17 U/L (ref 10–40)
ANION GAP SERPL CALCULATED.3IONS-SCNC: 12 MMOL/L (ref 3–16)
AST SERPL-CCNC: 14 U/L (ref 15–37)
BASOPHILS # BLD: 0 K/UL (ref 0–0.2)
BASOPHILS NFR BLD: 0.3 %
BILIRUB SERPL-MCNC: <0.2 MG/DL (ref 0–1)
BUN SERPL-MCNC: 23 MG/DL (ref 7–20)
CALCIUM SERPL-MCNC: 10 MG/DL (ref 8.3–10.6)
CHLORIDE SERPL-SCNC: 103 MMOL/L (ref 99–110)
CHOLEST SERPL-MCNC: 177 MG/DL (ref 0–199)
CO2 SERPL-SCNC: 26 MMOL/L (ref 21–32)
CREAT SERPL-MCNC: 0.6 MG/DL (ref 0.6–1.1)
DEPRECATED RDW RBC AUTO: 15 % (ref 12.4–15.4)
EOSINOPHIL # BLD: 0 K/UL (ref 0–0.6)
EOSINOPHIL NFR BLD: 0.1 %
GFR SERPLBLD CREATININE-BSD FMLA CKD-EPI: >60 ML/MIN/{1.73_M2}
GLUCOSE SERPL-MCNC: 104 MG/DL (ref 70–99)
HCT VFR BLD AUTO: 41.9 % (ref 36–48)
HDLC SERPL-MCNC: 51 MG/DL (ref 40–60)
HGB BLD-MCNC: 14.1 G/DL (ref 12–16)
LDLC SERPL CALC-MCNC: 103 MG/DL
LYMPHOCYTES # BLD: 2.7 K/UL (ref 1–5.1)
LYMPHOCYTES NFR BLD: 24 %
MCH RBC QN AUTO: 29.9 PG (ref 26–34)
MCHC RBC AUTO-ENTMCNC: 33.8 G/DL (ref 31–36)
MCV RBC AUTO: 88.6 FL (ref 80–100)
MONOCYTES # BLD: 0.8 K/UL (ref 0–1.3)
MONOCYTES NFR BLD: 6.9 %
NEUTROPHILS # BLD: 7.8 K/UL (ref 1.7–7.7)
NEUTROPHILS NFR BLD: 68.7 %
PLATELET # BLD AUTO: 248 K/UL (ref 135–450)
PMV BLD AUTO: 10.8 FL (ref 5–10.5)
POTASSIUM SERPL-SCNC: 4.4 MMOL/L (ref 3.5–5.1)
PROT SERPL-MCNC: 7.7 G/DL (ref 6.4–8.2)
RBC # BLD AUTO: 4.73 M/UL (ref 4–5.2)
SODIUM SERPL-SCNC: 141 MMOL/L (ref 136–145)
TRIGL SERPL-MCNC: 115 MG/DL (ref 0–150)
VLDLC SERPL CALC-MCNC: 23 MG/DL
WBC # BLD AUTO: 11.3 K/UL (ref 4–11)

## 2024-03-06 PROCEDURE — 36415 COLL VENOUS BLD VENIPUNCTURE: CPT | Performed by: NURSE PRACTITIONER

## 2024-03-08 DIAGNOSIS — D72.829 LEUKOCYTOSIS, UNSPECIFIED TYPE: Primary | ICD-10-CM

## 2024-09-04 ENCOUNTER — TELEPHONE (OUTPATIENT)
Dept: FAMILY MEDICINE CLINIC | Age: 59
End: 2024-09-04

## 2024-09-04 NOTE — TELEPHONE ENCOUNTER
Received a phone call stating that there was an error in the pt's chart--that is located in the patient chart advisories for the incorrect registration DOS.

## 2024-09-09 ENCOUNTER — OFFICE VISIT (OUTPATIENT)
Dept: FAMILY MEDICINE CLINIC | Age: 59
End: 2024-09-09
Payer: COMMERCIAL

## 2024-09-09 VITALS
HEIGHT: 63 IN | DIASTOLIC BLOOD PRESSURE: 74 MMHG | SYSTOLIC BLOOD PRESSURE: 126 MMHG | BODY MASS INDEX: 51.91 KG/M2 | HEART RATE: 90 BPM | OXYGEN SATURATION: 97 % | WEIGHT: 293 LBS

## 2024-09-09 DIAGNOSIS — Z12.31 OTHER SCREENING MAMMOGRAM: ICD-10-CM

## 2024-09-09 DIAGNOSIS — Z23 NEED FOR PROPHYLACTIC VACCINATION AGAINST STREPTOCOCCUS PNEUMONIAE (PNEUMOCOCCUS): ICD-10-CM

## 2024-09-09 DIAGNOSIS — Z12.11 SCREENING FOR COLON CANCER: ICD-10-CM

## 2024-09-09 DIAGNOSIS — I10 PRIMARY HYPERTENSION: ICD-10-CM

## 2024-09-09 DIAGNOSIS — F41.9 ANXIETY: ICD-10-CM

## 2024-09-09 DIAGNOSIS — Z23 NEED FOR SHINGLES VACCINE: ICD-10-CM

## 2024-09-09 DIAGNOSIS — E55.9 VITAMIN D DEFICIENCY: ICD-10-CM

## 2024-09-09 DIAGNOSIS — F33.0 MILD EPISODE OF RECURRENT MAJOR DEPRESSIVE DISORDER (HCC): Primary | ICD-10-CM

## 2024-09-09 DIAGNOSIS — Z23 NEED FOR PROPHYLACTIC VACCINATION AND INOCULATION AGAINST INFLUENZA: ICD-10-CM

## 2024-09-09 PROCEDURE — 90677 PCV20 VACCINE IM: CPT | Performed by: NURSE PRACTITIONER

## 2024-09-09 PROCEDURE — 3074F SYST BP LT 130 MM HG: CPT | Performed by: NURSE PRACTITIONER

## 2024-09-09 PROCEDURE — 90471 IMMUNIZATION ADMIN: CPT | Performed by: NURSE PRACTITIONER

## 2024-09-09 PROCEDURE — 3078F DIAST BP <80 MM HG: CPT | Performed by: NURSE PRACTITIONER

## 2024-09-09 PROCEDURE — 99214 OFFICE O/P EST MOD 30 MIN: CPT | Performed by: NURSE PRACTITIONER

## 2024-09-09 PROCEDURE — 90750 HZV VACC RECOMBINANT IM: CPT | Performed by: NURSE PRACTITIONER

## 2024-09-09 PROCEDURE — 90472 IMMUNIZATION ADMIN EACH ADD: CPT | Performed by: NURSE PRACTITIONER

## 2024-09-09 RX ORDER — CHOLECALCIFEROL (VITAMIN D3) 1250 MCG
CAPSULE ORAL
Qty: 4 CAPSULE | Refills: 5 | Status: SHIPPED | OUTPATIENT
Start: 2024-09-09

## 2024-09-09 RX ORDER — VENLAFAXINE HYDROCHLORIDE 150 MG/1
150 CAPSULE, EXTENDED RELEASE ORAL DAILY
Qty: 90 CAPSULE | Refills: 1 | Status: SHIPPED | OUTPATIENT
Start: 2024-09-09

## 2024-09-09 RX ORDER — LISINOPRIL AND HYDROCHLOROTHIAZIDE 20; 25 MG/1; MG/1
1 TABLET ORAL DAILY
Qty: 90 TABLET | Refills: 1 | Status: SHIPPED | OUTPATIENT
Start: 2024-09-09

## 2024-09-09 SDOH — ECONOMIC STABILITY: FOOD INSECURITY: WITHIN THE PAST 12 MONTHS, THE FOOD YOU BOUGHT JUST DIDN'T LAST AND YOU DIDN'T HAVE MONEY TO GET MORE.: NEVER TRUE

## 2024-09-09 SDOH — ECONOMIC STABILITY: FOOD INSECURITY: WITHIN THE PAST 12 MONTHS, YOU WORRIED THAT YOUR FOOD WOULD RUN OUT BEFORE YOU GOT MONEY TO BUY MORE.: NEVER TRUE

## 2024-09-09 SDOH — ECONOMIC STABILITY: INCOME INSECURITY: HOW HARD IS IT FOR YOU TO PAY FOR THE VERY BASICS LIKE FOOD, HOUSING, MEDICAL CARE, AND HEATING?: NOT HARD AT ALL

## 2024-09-09 ASSESSMENT — ENCOUNTER SYMPTOMS
ABDOMINAL PAIN: 0
ALLERGIC/IMMUNOLOGIC NEGATIVE: 1
BLOOD IN STOOL: 0
ANAL BLEEDING: 0
SHORTNESS OF BREATH: 0
GASTROINTESTINAL NEGATIVE: 1
EYES NEGATIVE: 1
RESPIRATORY NEGATIVE: 1
NAUSEA: 0

## 2024-09-09 ASSESSMENT — PATIENT HEALTH QUESTIONNAIRE - PHQ9
10. IF YOU CHECKED OFF ANY PROBLEMS, HOW DIFFICULT HAVE THESE PROBLEMS MADE IT FOR YOU TO DO YOUR WORK, TAKE CARE OF THINGS AT HOME, OR GET ALONG WITH OTHER PEOPLE: NOT DIFFICULT AT ALL
7. TROUBLE CONCENTRATING ON THINGS, SUCH AS READING THE NEWSPAPER OR WATCHING TELEVISION: NOT AT ALL
SUM OF ALL RESPONSES TO PHQ9 QUESTIONS 1 & 2: 0
2. FEELING DOWN, DEPRESSED OR HOPELESS: NOT AT ALL
4. FEELING TIRED OR HAVING LITTLE ENERGY: NOT AT ALL
SUM OF ALL RESPONSES TO PHQ QUESTIONS 1-9: 0
SUM OF ALL RESPONSES TO PHQ QUESTIONS 1-9: 0
6. FEELING BAD ABOUT YOURSELF - OR THAT YOU ARE A FAILURE OR HAVE LET YOURSELF OR YOUR FAMILY DOWN: NOT AT ALL
SUM OF ALL RESPONSES TO PHQ QUESTIONS 1-9: 0
8. MOVING OR SPEAKING SO SLOWLY THAT OTHER PEOPLE COULD HAVE NOTICED. OR THE OPPOSITE, BEING SO FIGETY OR RESTLESS THAT YOU HAVE BEEN MOVING AROUND A LOT MORE THAN USUAL: NOT AT ALL
3. TROUBLE FALLING OR STAYING ASLEEP: NOT AT ALL
1. LITTLE INTEREST OR PLEASURE IN DOING THINGS: NOT AT ALL
5. POOR APPETITE OR OVEREATING: NOT AT ALL
SUM OF ALL RESPONSES TO PHQ QUESTIONS 1-9: 0
9. THOUGHTS THAT YOU WOULD BE BETTER OFF DEAD, OR OF HURTING YOURSELF: NOT AT ALL

## 2024-09-09 ASSESSMENT — ANXIETY QUESTIONNAIRES
2. NOT BEING ABLE TO STOP OR CONTROL WORRYING: MORE THAN HALF THE DAYS
6. BECOMING EASILY ANNOYED OR IRRITABLE: NOT AT ALL
IF YOU CHECKED OFF ANY PROBLEMS ON THIS QUESTIONNAIRE, HOW DIFFICULT HAVE THESE PROBLEMS MADE IT FOR YOU TO DO YOUR WORK, TAKE CARE OF THINGS AT HOME, OR GET ALONG WITH OTHER PEOPLE: NOT DIFFICULT AT ALL
5. BEING SO RESTLESS THAT IT IS HARD TO SIT STILL: NOT AT ALL
GAD7 TOTAL SCORE: 5
4. TROUBLE RELAXING: SEVERAL DAYS
7. FEELING AFRAID AS IF SOMETHING AWFUL MIGHT HAPPEN: NOT AT ALL
1. FEELING NERVOUS, ANXIOUS, OR ON EDGE: NOT AT ALL
3. WORRYING TOO MUCH ABOUT DIFFERENT THINGS: MORE THAN HALF THE DAYS

## 2025-03-10 ENCOUNTER — OFFICE VISIT (OUTPATIENT)
Dept: FAMILY MEDICINE CLINIC | Age: 60
End: 2025-03-10
Payer: COMMERCIAL

## 2025-03-10 ENCOUNTER — RESULTS FOLLOW-UP (OUTPATIENT)
Dept: FAMILY MEDICINE CLINIC | Age: 60
End: 2025-03-10

## 2025-03-10 VITALS
DIASTOLIC BLOOD PRESSURE: 80 MMHG | HEIGHT: 63 IN | BODY MASS INDEX: 51.91 KG/M2 | WEIGHT: 293 LBS | SYSTOLIC BLOOD PRESSURE: 128 MMHG | HEART RATE: 94 BPM

## 2025-03-10 DIAGNOSIS — F33.0 MILD EPISODE OF RECURRENT MAJOR DEPRESSIVE DISORDER: ICD-10-CM

## 2025-03-10 DIAGNOSIS — R39.9 UTI SYMPTOMS: ICD-10-CM

## 2025-03-10 DIAGNOSIS — F41.9 ANXIETY: ICD-10-CM

## 2025-03-10 DIAGNOSIS — Z72.0 TOBACCO ABUSE: ICD-10-CM

## 2025-03-10 DIAGNOSIS — Z13.220 SCREENING FOR CHOLESTEROL LEVEL: ICD-10-CM

## 2025-03-10 DIAGNOSIS — I10 PRIMARY HYPERTENSION: ICD-10-CM

## 2025-03-10 DIAGNOSIS — E55.9 VITAMIN D DEFICIENCY: ICD-10-CM

## 2025-03-10 DIAGNOSIS — Z12.31 BREAST CANCER SCREENING BY MAMMOGRAM: ICD-10-CM

## 2025-03-10 DIAGNOSIS — I10 ESSENTIAL HYPERTENSION: Primary | ICD-10-CM

## 2025-03-10 LAB
BILIRUBIN, POC: ABNORMAL
BLOOD URINE, POC: ABNORMAL
CLARITY, POC: ABNORMAL
COLOR, POC: YELLOW
GLUCOSE URINE, POC: ABNORMAL MG/DL
KETONES, POC: ABNORMAL MG/DL
LEUKOCYTE EST, POC: ABNORMAL
NITRITE, POC: POSITIVE
PH, POC: 6
PROTEIN, POC: ABNORMAL MG/DL
SPECIFIC GRAVITY, POC: 1.02
UROBILINOGEN, POC: 0.2 MG/DL

## 2025-03-10 PROCEDURE — 3079F DIAST BP 80-89 MM HG: CPT | Performed by: NURSE PRACTITIONER

## 2025-03-10 PROCEDURE — 81002 URINALYSIS NONAUTO W/O SCOPE: CPT | Performed by: NURSE PRACTITIONER

## 2025-03-10 PROCEDURE — 3074F SYST BP LT 130 MM HG: CPT | Performed by: NURSE PRACTITIONER

## 2025-03-10 PROCEDURE — 99204 OFFICE O/P NEW MOD 45 MIN: CPT | Performed by: NURSE PRACTITIONER

## 2025-03-10 RX ORDER — LISINOPRIL AND HYDROCHLOROTHIAZIDE 20; 25 MG/1; MG/1
1 TABLET ORAL DAILY
Qty: 90 TABLET | Refills: 1 | Status: SHIPPED | OUTPATIENT
Start: 2025-03-10

## 2025-03-10 RX ORDER — VENLAFAXINE HYDROCHLORIDE 150 MG/1
150 CAPSULE, EXTENDED RELEASE ORAL DAILY
Qty: 90 CAPSULE | Refills: 1 | Status: CANCELLED | OUTPATIENT
Start: 2025-03-10

## 2025-03-10 RX ORDER — DESVENLAFAXINE 100 MG/1
100 TABLET, EXTENDED RELEASE ORAL DAILY
Qty: 30 TABLET | Refills: 3 | Status: SHIPPED | OUTPATIENT
Start: 2025-03-10

## 2025-03-10 RX ORDER — PHENAZOPYRIDINE HYDROCHLORIDE 200 MG/1
200 TABLET, FILM COATED ORAL 3 TIMES DAILY PRN
Qty: 9 TABLET | Refills: 0 | Status: SHIPPED | OUTPATIENT
Start: 2025-03-10 | End: 2025-03-14 | Stop reason: ALTCHOICE

## 2025-03-10 RX ORDER — SULFAMETHOXAZOLE AND TRIMETHOPRIM 800; 160 MG/1; MG/1
1 TABLET ORAL 2 TIMES DAILY
Qty: 14 TABLET | Refills: 0 | Status: SHIPPED | OUTPATIENT
Start: 2025-03-10 | End: 2025-03-12

## 2025-03-10 RX ORDER — CHOLECALCIFEROL (VITAMIN D3) 1250 MCG
CAPSULE ORAL
Qty: 4 CAPSULE | Refills: 5 | Status: SHIPPED | OUTPATIENT
Start: 2025-03-10

## 2025-03-10 ASSESSMENT — ENCOUNTER SYMPTOMS
RESPIRATORY NEGATIVE: 1
EYES NEGATIVE: 1
GASTROINTESTINAL NEGATIVE: 1
SHORTNESS OF BREATH: 0
ALLERGIC/IMMUNOLOGIC NEGATIVE: 1
BLOOD IN STOOL: 0
ABDOMINAL PAIN: 0
NAUSEA: 0
ANAL BLEEDING: 0

## 2025-03-10 ASSESSMENT — ANXIETY QUESTIONNAIRES
2. NOT BEING ABLE TO STOP OR CONTROL WORRYING: MORE THAN HALF THE DAYS
7. FEELING AFRAID AS IF SOMETHING AWFUL MIGHT HAPPEN: NOT AT ALL
IF YOU CHECKED OFF ANY PROBLEMS ON THIS QUESTIONNAIRE, HOW DIFFICULT HAVE THESE PROBLEMS MADE IT FOR YOU TO DO YOUR WORK, TAKE CARE OF THINGS AT HOME, OR GET ALONG WITH OTHER PEOPLE: SOMEWHAT DIFFICULT
GAD7 TOTAL SCORE: 9
6. BECOMING EASILY ANNOYED OR IRRITABLE: SEVERAL DAYS
5. BEING SO RESTLESS THAT IT IS HARD TO SIT STILL: SEVERAL DAYS
3. WORRYING TOO MUCH ABOUT DIFFERENT THINGS: MORE THAN HALF THE DAYS
1. FEELING NERVOUS, ANXIOUS, OR ON EDGE: SEVERAL DAYS
4. TROUBLE RELAXING: MORE THAN HALF THE DAYS

## 2025-03-10 ASSESSMENT — PATIENT HEALTH QUESTIONNAIRE - PHQ9
4. FEELING TIRED OR HAVING LITTLE ENERGY: MORE THAN HALF THE DAYS
8. MOVING OR SPEAKING SO SLOWLY THAT OTHER PEOPLE COULD HAVE NOTICED. OR THE OPPOSITE, BEING SO FIGETY OR RESTLESS THAT YOU HAVE BEEN MOVING AROUND A LOT MORE THAN USUAL: NOT AT ALL
5. POOR APPETITE OR OVEREATING: MORE THAN HALF THE DAYS
10. IF YOU CHECKED OFF ANY PROBLEMS, HOW DIFFICULT HAVE THESE PROBLEMS MADE IT FOR YOU TO DO YOUR WORK, TAKE CARE OF THINGS AT HOME, OR GET ALONG WITH OTHER PEOPLE: SOMEWHAT DIFFICULT
SUM OF ALL RESPONSES TO PHQ QUESTIONS 1-9: 10
3. TROUBLE FALLING OR STAYING ASLEEP: MORE THAN HALF THE DAYS
1. LITTLE INTEREST OR PLEASURE IN DOING THINGS: NEARLY EVERY DAY
9. THOUGHTS THAT YOU WOULD BE BETTER OFF DEAD, OR OF HURTING YOURSELF: NOT AT ALL
SUM OF ALL RESPONSES TO PHQ QUESTIONS 1-9: 10
6. FEELING BAD ABOUT YOURSELF - OR THAT YOU ARE A FAILURE OR HAVE LET YOURSELF OR YOUR FAMILY DOWN: NOT AT ALL
7. TROUBLE CONCENTRATING ON THINGS, SUCH AS READING THE NEWSPAPER OR WATCHING TELEVISION: SEVERAL DAYS
2. FEELING DOWN, DEPRESSED OR HOPELESS: NOT AT ALL

## 2025-03-10 NOTE — PROGRESS NOTES
her blood pressure sporadically at home, with systolic readings typically in the 120s and diastolic readings varying from the 60s to low 70s. She experienced an episode of illness approximately 3 weeks ago, characterized by vomiting and elevated blood pressure, during which she was unable to take her medication. She reports no chest pain, unusual shortness of breath, lightheadedness, dizziness, syncope, headaches, or blurred vision. Occasionally, she experiences a sensation of increased pulse rate, particularly when anxious or upset. She also notes mild leg swelling, which resolves by morning. She reports no suspected side effects from her medications. She is currently on lisinopril/HCTZ.    She reports a lack of motivation and poor concentration, with occasional feelings of anxiety and nervousness. She believes her depression and anxiety have worsened, accompanied by fatigue. She does not report any sleep disturbances but admits to overeating. She reports no recent life changes or financial stressors that could have exacerbated her depression. Despite planning a summer vacation to [place] with her grandchildren, she expresses a reluctance to leave the house. She is not fasting today. She is currently on Effexor  mg daily.    She has been experiencing urinary symptoms for several weeks, which she initially managed by increasing her fluid intake. She reports occasional dysuria, increased urinary frequency and urgency, and decreased urine volume. She reports no renal pain, vaginal bleeding, hematuria, or vaginal discharge. She also reports no systemic symptoms such as fever, chills, nausea, or vomiting. Over-the-counter Pyridium was ineffective.    She has been without her vitamin D supplement for the past 1.5 months due to a prescription refill issue. She is currently on vitamin D 50,000 international units weekly.    ALLERGIES  The patient has no known allergies.    MEDICATIONS  Current: Lisinopril/HCTZ,

## 2025-03-12 LAB
BACTERIA UR CULT: ABNORMAL
ORGANISM: ABNORMAL

## 2025-03-12 RX ORDER — CEFUROXIME AXETIL 500 MG/1
500 TABLET ORAL 2 TIMES DAILY
Qty: 14 TABLET | Refills: 0 | Status: SHIPPED | OUTPATIENT
Start: 2025-03-12

## 2025-03-12 RX ORDER — CEFUROXIME AXETIL 500 MG/1
500 TABLET ORAL 2 TIMES DAILY
COMMUNITY
End: 2025-03-12 | Stop reason: SDUPTHER

## 2025-03-12 NOTE — TELEPHONE ENCOUNTER
----- Message from ULISES Costa CNP sent at 3/12/2025  3:32 PM EDT -----  Urine culture is positive for UTI caused by E. coli.  Patient was prescribed Bactrim DS--unfortunately it appears that this bacteria is resistant to the Bactrim DS so patient should stop taking that antibiotic and start Ceftin 500 mg 1 p.o. twice daily x 7 days #14 with no refills--please notify patient and send prescription to pharmacy of choice

## 2025-03-13 ENCOUNTER — NURSE ONLY (OUTPATIENT)
Dept: FAMILY MEDICINE CLINIC | Age: 60
End: 2025-03-13
Payer: COMMERCIAL

## 2025-03-13 DIAGNOSIS — F33.0 MILD EPISODE OF RECURRENT MAJOR DEPRESSIVE DISORDER: ICD-10-CM

## 2025-03-13 DIAGNOSIS — I10 ESSENTIAL HYPERTENSION: ICD-10-CM

## 2025-03-13 DIAGNOSIS — F41.9 ANXIETY: ICD-10-CM

## 2025-03-13 DIAGNOSIS — Z13.220 SCREENING FOR CHOLESTEROL LEVEL: ICD-10-CM

## 2025-03-13 DIAGNOSIS — E55.9 VITAMIN D DEFICIENCY: ICD-10-CM

## 2025-03-13 LAB
25(OH)D3 SERPL-MCNC: 28.4 NG/ML
ALBUMIN SERPL-MCNC: 4.5 G/DL (ref 3.4–5)
ALBUMIN/GLOB SERPL: 1.6 {RATIO} (ref 1.1–2.2)
ALP SERPL-CCNC: 61 U/L (ref 40–129)
ALT SERPL-CCNC: 36 U/L (ref 10–40)
ANION GAP SERPL CALCULATED.3IONS-SCNC: 11 MMOL/L (ref 3–16)
AST SERPL-CCNC: 30 U/L (ref 15–37)
BASOPHILS # BLD: 0 K/UL (ref 0–0.2)
BASOPHILS NFR BLD: 0.5 %
BILIRUB SERPL-MCNC: 0.4 MG/DL (ref 0–1)
BUN SERPL-MCNC: 18 MG/DL (ref 7–20)
CALCIUM SERPL-MCNC: 10.1 MG/DL (ref 8.3–10.6)
CHLORIDE SERPL-SCNC: 101 MMOL/L (ref 99–110)
CHOLEST SERPL-MCNC: 135 MG/DL (ref 0–199)
CO2 SERPL-SCNC: 26 MMOL/L (ref 21–32)
CREAT SERPL-MCNC: 0.8 MG/DL (ref 0.6–1.1)
DEPRECATED RDW RBC AUTO: 14.7 % (ref 12.4–15.4)
EOSINOPHIL # BLD: 0.2 K/UL (ref 0–0.6)
EOSINOPHIL NFR BLD: 2.3 %
GFR SERPLBLD CREATININE-BSD FMLA CKD-EPI: 85 ML/MIN/{1.73_M2}
GLUCOSE SERPL-MCNC: 126 MG/DL (ref 70–99)
HCT VFR BLD AUTO: 42.5 % (ref 36–48)
HDLC SERPL-MCNC: 37 MG/DL (ref 40–60)
HGB BLD-MCNC: 14 G/DL (ref 12–16)
LDLC SERPL CALC-MCNC: 69 MG/DL
LYMPHOCYTES # BLD: 2.8 K/UL (ref 1–5.1)
LYMPHOCYTES NFR BLD: 40.1 %
MCH RBC QN AUTO: 29.4 PG (ref 26–34)
MCHC RBC AUTO-ENTMCNC: 33 G/DL (ref 31–36)
MCV RBC AUTO: 89.1 FL (ref 80–100)
MONOCYTES # BLD: 0.9 K/UL (ref 0–1.3)
MONOCYTES NFR BLD: 12.3 %
NEUTROPHILS # BLD: 3.2 K/UL (ref 1.7–7.7)
NEUTROPHILS NFR BLD: 44.8 %
PLATELET # BLD AUTO: 252 K/UL (ref 135–450)
PMV BLD AUTO: 10.3 FL (ref 5–10.5)
POTASSIUM SERPL-SCNC: 4.1 MMOL/L (ref 3.5–5.1)
PROT SERPL-MCNC: 7.4 G/DL (ref 6.4–8.2)
RBC # BLD AUTO: 4.77 M/UL (ref 4–5.2)
SODIUM SERPL-SCNC: 138 MMOL/L (ref 136–145)
TRIGL SERPL-MCNC: 144 MG/DL (ref 0–150)
TSH SERPL DL<=0.005 MIU/L-ACNC: 1.06 UIU/ML (ref 0.27–4.2)
VLDLC SERPL CALC-MCNC: 29 MG/DL
WBC # BLD AUTO: 7 K/UL (ref 4–11)

## 2025-03-13 PROCEDURE — 36415 COLL VENOUS BLD VENIPUNCTURE: CPT | Performed by: NURSE PRACTITIONER

## 2025-03-14 DIAGNOSIS — R73.09 ELEVATED GLUCOSE LEVEL: Primary | ICD-10-CM

## 2025-03-14 LAB
EST. AVERAGE GLUCOSE BLD GHB EST-MCNC: 137 MG/DL
HBA1C MFR BLD: 6.4 %

## 2025-03-14 PROCEDURE — 36415 COLL VENOUS BLD VENIPUNCTURE: CPT | Performed by: NURSE PRACTITIONER

## 2025-05-09 DIAGNOSIS — F41.9 ANXIETY: ICD-10-CM

## 2025-05-09 DIAGNOSIS — F33.0 MILD EPISODE OF RECURRENT MAJOR DEPRESSIVE DISORDER: ICD-10-CM

## 2025-05-09 RX ORDER — VENLAFAXINE HYDROCHLORIDE 150 MG/1
150 CAPSULE, EXTENDED RELEASE ORAL DAILY
Qty: 90 CAPSULE | Refills: 1 | OUTPATIENT
Start: 2025-05-09

## 2025-05-09 NOTE — TELEPHONE ENCOUNTER
Refill Request     CONFIRM preferrred pharmacy with the patient.    If Mail Order Rx - Pend for 90 day refill.      Last Seen: Last Seen Department: 3/10/2025  Last Seen by PCP: 3/10/2025    Last Written:     If no future appointment scheduled, route STAFF MESSAGE with patient name to the  Pool for scheduling.      Next Appointment:   Future Appointments   Date Time Provider Department Center   9/15/2025 11:00 AM Rebecca Elena, [facility]       Message sent to  to schedule appt with patient?  NO      Requested Prescriptions     Pending Prescriptions Disp Refills    venlafaxine (EFFEXOR XR) 150 MG extended release capsule [Pharmacy Med Name: VENLAFAXINE HCL  MG CAP] 90 capsule 1     Sig: TAKE 1 CAPSULE BY MOUTH DAILY

## 2025-05-23 DIAGNOSIS — I10 PRIMARY HYPERTENSION: ICD-10-CM

## 2025-05-23 RX ORDER — LISINOPRIL AND HYDROCHLOROTHIAZIDE 20; 25 MG/1; MG/1
1 TABLET ORAL DAILY
Qty: 90 TABLET | Refills: 0 | Status: SHIPPED | OUTPATIENT
Start: 2025-05-23

## 2025-05-23 NOTE — TELEPHONE ENCOUNTER
Refill Request     CONFIRM preferrred pharmacy with the patient.    If Mail Order Rx - Pend for 90 day refill.      Last Seen: Last Seen Department: 3/10/2025  Last Seen by PCP: 3/10/2025    Last Written: 3/10/25    If no future appointment scheduled, route STAFF MESSAGE with patient name to the  Pool for scheduling.      Next Appointment:   Future Appointments   Date Time Provider Department Center   9/15/2025 11:00 AM Rebecca Elena, [facility]       Message sent to  to schedule appt with patient?  N/A      Requested Prescriptions     Pending Prescriptions Disp Refills    lisinopril-hydroCHLOROthiazide (PRINZIDE;ZESTORETIC) 20-25 MG per tablet 90 tablet 0     Sig: Take 1 tablet by mouth daily

## 2025-06-19 RX ORDER — DESVENLAFAXINE 100 MG/1
100 TABLET, EXTENDED RELEASE ORAL DAILY
Qty: 30 TABLET | Refills: 3 | Status: SHIPPED | OUTPATIENT
Start: 2025-06-19

## 2025-06-19 NOTE — TELEPHONE ENCOUNTER
Refill Request     CONFIRM preferrred pharmacy with the patient.    If Mail Order Rx - Pend for 90 day refill.      Last Seen: Last Seen Department: 3/10/2025  Last Seen by PCP: 3/10/2025    Last Written: 3.10.25    If no future appointment scheduled, route STAFF MESSAGE with patient name to the  Pool for scheduling.      Next Appointment:   Future Appointments   Date Time Provider Department Center   9/15/2025 11:00 AM Rebecca Elena[facility]       Message sent to  to schedule appt with patient?  NO      Requested Prescriptions     Pending Prescriptions Disp Refills    desvenlafaxine succinate (PRISTIQ) 100 MG TB24 extended release tablet 30 tablet 3     Sig: Take 1 tablet by mouth daily

## 2025-08-18 DIAGNOSIS — I10 PRIMARY HYPERTENSION: ICD-10-CM

## 2025-08-18 RX ORDER — LISINOPRIL AND HYDROCHLOROTHIAZIDE 20; 25 MG/1; MG/1
1 TABLET ORAL DAILY
Qty: 90 TABLET | Refills: 0 | Status: SHIPPED | OUTPATIENT
Start: 2025-08-18